# Patient Record
Sex: MALE | Race: BLACK OR AFRICAN AMERICAN | NOT HISPANIC OR LATINO | Employment: FULL TIME | URBAN - METROPOLITAN AREA
[De-identification: names, ages, dates, MRNs, and addresses within clinical notes are randomized per-mention and may not be internally consistent; named-entity substitution may affect disease eponyms.]

---

## 2017-02-21 ENCOUNTER — LAB VISIT (OUTPATIENT)
Dept: LAB | Facility: HOSPITAL | Age: 47
End: 2017-02-21
Attending: INTERNAL MEDICINE
Payer: COMMERCIAL

## 2017-02-21 ENCOUNTER — OFFICE VISIT (OUTPATIENT)
Dept: INTERNAL MEDICINE | Facility: CLINIC | Age: 47
End: 2017-02-21
Payer: COMMERCIAL

## 2017-02-21 VITALS
HEART RATE: 75 BPM | OXYGEN SATURATION: 98 % | DIASTOLIC BLOOD PRESSURE: 100 MMHG | TEMPERATURE: 97 F | SYSTOLIC BLOOD PRESSURE: 134 MMHG | HEIGHT: 73 IN | WEIGHT: 298.06 LBS | BODY MASS INDEX: 39.5 KG/M2

## 2017-02-21 DIAGNOSIS — E66.01 SEVERE OBESITY (BMI 35.0-39.9) WITH COMORBIDITY: ICD-10-CM

## 2017-02-21 DIAGNOSIS — I10 HYPERTENSION GOAL BP (BLOOD PRESSURE) < 140/90: Primary | ICD-10-CM

## 2017-02-21 DIAGNOSIS — I10 HYPERTENSION GOAL BP (BLOOD PRESSURE) < 140/90: ICD-10-CM

## 2017-02-21 LAB
ANION GAP SERPL CALC-SCNC: 5 MMOL/L
BUN SERPL-MCNC: 13 MG/DL
CALCIUM SERPL-MCNC: 9.2 MG/DL
CHLORIDE SERPL-SCNC: 107 MMOL/L
CHOLEST/HDLC SERPL: 3.1 {RATIO}
CO2 SERPL-SCNC: 27 MMOL/L
CREAT SERPL-MCNC: 1 MG/DL
EST. GFR  (AFRICAN AMERICAN): >60 ML/MIN/1.73 M^2
EST. GFR  (NON AFRICAN AMERICAN): >60 ML/MIN/1.73 M^2
GLUCOSE SERPL-MCNC: 82 MG/DL
HDL/CHOLESTEROL RATIO: 32.3 %
HDLC SERPL-MCNC: 189 MG/DL
HDLC SERPL-MCNC: 61 MG/DL
LDLC SERPL CALC-MCNC: 116.6 MG/DL
NONHDLC SERPL-MCNC: 128 MG/DL
POTASSIUM SERPL-SCNC: 4.7 MMOL/L
SODIUM SERPL-SCNC: 139 MMOL/L
TRIGL SERPL-MCNC: 57 MG/DL

## 2017-02-21 PROCEDURE — 36415 COLL VENOUS BLD VENIPUNCTURE: CPT

## 2017-02-21 PROCEDURE — 3078F DIAST BP <80 MM HG: CPT | Mod: S$GLB,,, | Performed by: INTERNAL MEDICINE

## 2017-02-21 PROCEDURE — 80061 LIPID PANEL: CPT

## 2017-02-21 PROCEDURE — 80048 BASIC METABOLIC PNL TOTAL CA: CPT

## 2017-02-21 PROCEDURE — 99214 OFFICE O/P EST MOD 30 MIN: CPT | Mod: S$GLB,,, | Performed by: INTERNAL MEDICINE

## 2017-02-21 PROCEDURE — 1160F RVW MEDS BY RX/DR IN RCRD: CPT | Mod: S$GLB,,, | Performed by: INTERNAL MEDICINE

## 2017-02-21 PROCEDURE — 3075F SYST BP GE 130 - 139MM HG: CPT | Mod: S$GLB,,, | Performed by: INTERNAL MEDICINE

## 2017-02-21 PROCEDURE — 99999 PR PBB SHADOW E&M-EST. PATIENT-LVL III: CPT | Mod: PBBFAC,,, | Performed by: INTERNAL MEDICINE

## 2017-02-21 RX ORDER — AMLODIPINE BESYLATE 10 MG/1
10 TABLET ORAL DAILY
Qty: 30 TABLET | Refills: 6 | Status: SHIPPED | OUTPATIENT
Start: 2017-02-21 | End: 2017-10-18 | Stop reason: SDUPTHER

## 2017-02-21 RX ORDER — LOSARTAN POTASSIUM 100 MG/1
100 TABLET ORAL DAILY
Qty: 30 TABLET | Refills: 6 | Status: SHIPPED | OUTPATIENT
Start: 2017-02-21 | End: 2017-10-18 | Stop reason: SDUPTHER

## 2017-02-21 RX ORDER — ATENOLOL 50 MG/1
50 TABLET ORAL DAILY
Qty: 30 TABLET | Refills: 6 | Status: SHIPPED | OUTPATIENT
Start: 2017-02-21 | End: 2017-10-11 | Stop reason: SDUPTHER

## 2017-02-21 NOTE — MR AVS SNAPSHOT
[facility]  [address] [place] [id number]  Phone: [phone]  Fax: [phone]                  Odilon Del Rio   2017 8:40 AM   Office Visit    Description:  Male : 1970   Provider:  Lisa Osborn DO   Department:  [facility]           Reason for Visit     Follow-up           Diagnoses this Visit        Comments    Hypertension goal BP (blood pressure) < 140/90    -  Primary     Severe obesity (BMI 35.0-39.9) with comorbidity                To Do List           Goals (5 Years of Data)     None       These Medications        Disp Refills Start End    losartan (COZAAR) 100 MG tablet 30 tablet 6 2017     Take 1 tablet (100 mg total) by mouth once daily. - Oral    Pharmacy: [facility] [address] [place] [address] Ph #: [phone]       atenolol (TENORMIN) 50 MG tablet 30 tablet 6 2017     Take 1 tablet (50 mg total) by mouth once daily. - Oral    Pharmacy: [address] Ph #: [phone]       amlodipine (NORVASC) 10 MG tablet 30 tablet 6 2017     Take 1 tablet (10 mg total) by mouth once daily. - Oral    Pharmacy: [address] Ph #: [phone]         [facility]     [facility] Care Line -  Assistance  Registered nurses in the [facility] provide clinical advisement, health education, appointment booking, and other advisory services.  Call for this free service at [phone].             Medications           Message regarding Medications     Verify the changes and/or additions to your medication regime listed below are the same as discussed with your clinician today.  If any of these changes or additions are incorrect, please notify your healthcare provider.             Verify that the below list of medications is an accurate representation  "of the medications you are currently taking.  If none reported, the list may be blank. If incorrect, please contact your healthcare provider. Carry this list with you in case of emergency.           Current Medications     amlodipine (NORVASC) 10 MG tablet Take 1 tablet (10 mg total) by mouth once daily.    atenolol (TENORMIN) 50 MG tablet Take 1 tablet (50 mg total) by mouth once daily.    losartan (COZAAR) 100 MG tablet Take 1 tablet (100 mg total) by mouth once daily.           Clinical Reference Information           Your Vitals Were     BP Pulse Temp Height Weight SpO2    134/100 75 97.2 °F (36.2 °C) (Tympanic) 6' 1" (1.854 m) 135.2 kg (298 lb 1 oz) 98%    BMI                39.32 kg/m2          Blood Pressure          Most Recent Value    BP  (!)  134/100      Allergies as of 2/21/2017     No Known Drug Allergies      Immunizations Administered on Date of Encounter - 2/21/2017     None      [facility] Sign-Up     Activating your [facility] account is as easy as 1-2-3!     1) Visit [email or web address], select Sign Up Now, enter this activation code and your date of birth, then select Next.  [email or web address]  Expires: 4/7/2017  9:35 AM      2) Create a username and password to use when you visit [facility] in the future and select a security question in case you lose your password and select Next.    3) Enter your e-mail address and click Sign Up!    Additional Information  If you have questions, please e-mail [email or web address] or call [phone] to talk to our [facility] staff. Remember, [facility] is NOT to be used for urgent needs. For medical emergencies, dial 911.         Language Assistance Services     ATTENTION: Language assistance services are available, free of charge. Please call [phone].      ATENCIÓN: Si habla español, tiene a palacio disposición servicios gratuitos de asistencia lingüística. Llame al [phone].     CHÚ Ý: N?u b?n nói Ti?ng Vi?t, có các d?ch v? h? tr? ngôn ng? mi?n phí arlene dinh " b?n. G?i s? [phone].         [facility] complies with applicable Federal civil rights laws and does not discriminate on the basis of race, color, national origin, age, disability, or sex.

## 2017-02-21 NOTE — PROGRESS NOTES
Odilon Tejeda [place]  46 y.o. Black or  male    Chief Complaint   Patient presents with    Follow-up     HPI: Here today to follow up on chronic conditions.  HTN--he has been out of his b/p medication for several months. He has been checking his b/p on occasion and it has been high. He denies symptoms.   Lab Results   Component Value Date    LDLCALC 103.4 03/11/2015       Past Medical History   Diagnosis Date    Hypertension     Morbid obesity     CHARLINE (obstructive sleep apnea)     Testosterone deficiency        Current Outpatient Prescriptions:     amlodipine (NORVASC) 10 MG tablet, Take 1 tablet (10 mg total) by mouth once daily., Disp: 30 tablet, Rfl: 6    atenolol (TENORMIN) 50 MG tablet, Take 1 tablet (50 mg total) by mouth once daily., Disp: 30 tablet, Rfl: 6    losartan (COZAAR) 100 MG tablet, Take 1 tablet (100 mg total) by mouth once daily., Disp: 30 tablet, Rfl: 6    Allergies:  Review of patient's allergies indicates:   Allergen Reactions    No known drug allergies        ROS:  Denies headache, dizziness, chest pain or shortness of breath    PHYSICAL EXAM:  VITAL SIGNS: Reviewed  GENERAL: Alert and oriented, no acute distress  HEART: Normal S1 and S2, regular rate and rhythm  LUNGS: Bilaterally clear to auscultation, respirations unlabored    ASSESSMENT/PLAN:  Odilon was seen today for follow-up.    Diagnoses and all orders for this visit:    Hypertension goal BP (blood pressure) < 140/90  -     losartan (COZAAR) 100 MG tablet; Take 1 tablet (100 mg total) by mouth once daily.  -     atenolol (TENORMIN) 50 MG tablet; Take 1 tablet (50 mg total) by mouth once daily.  -     amlodipine (NORVASC) 10 MG tablet; Take 1 tablet (10 mg total) by mouth once daily.  -     Discussed diet, exercise and weight loss     Severe obesity (BMI 35.0-39.9) with comorbidity  -     Counseled regarding diet, exercise and weight loss     F/U in 3 months

## 2017-02-23 ENCOUNTER — NUTRITION (OUTPATIENT)
Dept: NUTRITION | Facility: CLINIC | Age: 47
End: 2017-02-23
Payer: COMMERCIAL

## 2017-02-23 VITALS
BODY MASS INDEX: 38.86 KG/M2 | HEIGHT: 73 IN | SYSTOLIC BLOOD PRESSURE: 140 MMHG | DIASTOLIC BLOOD PRESSURE: 100 MMHG | WEIGHT: 293.19 LBS

## 2017-02-23 DIAGNOSIS — Z71.3 DIETARY COUNSELING AND SURVEILLANCE: Primary | ICD-10-CM

## 2017-02-23 NOTE — PROGRESS NOTES
"IDEAL PROTEIN PROTOCOL  Weight Management Weekly Progress Record    PRIMARY PROVIDER:  Lisa Osborn DO   REFERRING PROVIDER: No ref. provider found  IDEAL PROTEIN RD/NP:  Pablo Elizabeth     MEASUREMENTS:  DATE---------Neck--------Arm-----Chest-----Waist----Hip-----Thigh---Weight---Total Lost  04/30/2014----18.3"--------17.5"-----57.7"-----52.5"-----51.9"----23.6"------337.8 lbs---0000 lbs   5/07/2014------17.9"-------16.9"------53.5"-----50.0"-----50.0"----23.6"------320.8 lbs---017.0 lbs   5/14/2014------17.5"-------16.4"------53.2"-----50.0"-----50.0"----23.6"------318.0 lbs---019.8 lbs   5/21/2014------17.0"-------16.0"------53.0"-----47.5"-----50.0"----23.0"------311.4 lbs---026.4 lbs   5/28/2014------17.0"-------16.0"------51.5"-----49.0"-----49.7"----23.0"------308.4 lbs---029.4 lbs   6/04/2014------16.8"-------16.5"------51.5"-----46.9"-----48.7"----23.2"------299.8 lbs---038.0 lbs   6/11/2014------16.7"-------16.6"------51.1"-----46.5"-----47.7"----23.0"------297.8 lbs---040.0 lbs   6/18/2014------16.7"-------16.2"------50.2"-----46.5"-----45.7"----22.5"------289.2 lbs---048.6 lbs   6/25/2014------16.5"-------16.0"------49.5"-----45.5"-----45.3"----22.4"------283.4 lbs---054.4 lbs   7/02/2014--------16.5"-------15.0"------49.5"-----45.5"-----45.3"----22.4"------281.4 lbs---056.4 lbs   7/09/2014--------16.2"-------15.0"------47.8"-----42.9"-----45.8"----22.8"------274.0 lbs---063.8 lbs   7/16/2014--------16.4"-------15.8"------48.0"-----45.0"-----45.8"----21.8"------273.6 lbs---064.2 lbs   7/25/2014--------16.2"-------15.6"------48.0"-----43.7"-----43.7"----21.0"------274.0 lbs---063.8 lbs   7/30/2014--------16.1"-------16.0"------47.5"-----44.0"-----42.7"----20.7"------269.8 lbs---068.0 lbs   8/08/2014--------16.1"-------16.2"------48.5"-----43.5"-----42.7"----22.0"------271.2 lbs---066.6lbs   8/15/2014--------16.1"-------15.8"------47.7"-----44.0"-----43.7"----21.5"------271.2 lbs---066.6lbs " "  8/20/2014--------16.5"-------15.7"------48.5"-----43.7"-----44.5"----22.0"------272.8 lbs---065.0lbs   8/03/2014--------16.8"-------16.2"------48.5"-----43.3"-----42.0"----22.0"------267.4 lbs---070.4lbs   12/3/2014--------16.5"-------16.7"------48.7"-----44.0"-----45.0"----21.0"------279.8 lbs---058.0lbs  1/21/2015--------17.6"-------17.2"------51.5"-----47.0"-----47.5"----22.0"------292.0 lbs---045.8lbs [facility] up.  1/28/2015--------17.3"-------16.9"------50.2"-----45.2"-----46.0"----22.0"------284.8 lbs---053.0lbs   2/04/2015--------16.4"-------16.7"------49.5"-----45.2"-----46.0"----22.0"------275.4 lbs---062.4lbs   2/11/2015--------16.4"-------16.5"------48.1"-----44.6"-----45.5"----21.5"------270.6 lbs---067.2lbs   2/25/2015--------16.4"-------16.4"------47.8"-----45.5"-----43.2"----21.5"------269.2 lbs---068.6lbs   3/11/2015--------16.9"-------16.0"------48.9"-----44.5"-----46.0"----23.0"------273.0 lbs---064.6lbs   3/18/2015--------17.4"-------16.0"------48.2"-----44.5"-----46.0"----22.4"------275.2 lbs---062.4lbs   7/16/2015--------17.4"-------16.2"------50.5"-----48.0"-----47.0"----20.5"------297.6 lbs---[id number] tune    7/24/2015--------17.4"-------16.2"------49.7''-----46.5"-----46.0"----20.5"------281.4 lbs---016.2lbs  7/31/2015--------17.0"-------16.1"------48.4''-----44.3"-----45.4"----20.4"------278.2 lbs---019.4lbs   8/14/2015--------17.0"-------16.3"------49.2''-----46.5"-----46.0"----23.0"------282.6 lbs---015.0lbs   8/28/2015--------17.0"-------16.3"------48.0''-----45.0"-----45.0"----20.6"------269.2 lbs---028.4lbs   9/04/2015--------16.5"-------16.3"------48.0''-----43.0"-----45.0"----20.0"------268.4 lbs---029.2lbs   9/11/2015--------16.5"-------16.3"------48.0''-----43.0"-----45.0"----20.0"------273.0 lbs---029.2lbs   9/18/2015--------16.5"-------16.0"------47.4''-----44.0"-----44.2"----20.0"------271.0 lbs---031.2lbs " "  10/16/2015--------16.7"------15.5"------47.0''-----43.5"-----44.5"----21.0"------271.0 lbs---031.2lbs   02/23/2017--------17.0"------16.7"------51.0''-----47.0"-----47.9"----23.8"------293.1 lbs---09.1lbs tune up         BODY COMPOSITION:   DATE----------BMI----Body Fat%--------Lean Mass----------Fat Mass----Hydration Index   04/30/2014-----44.6-------34.0%------------223.0 lbs-----------114.8 lbs---------  -8.37  5/07/2014-------42.3-------38.6%------------197.1 lbs-----------123.7 lbs---------  -6.33  5/14/2014-------42.0-------32.2%------------215.5 lbs-----------102.5 lbs---------  -7.42  5/21/2014-------41.1  5/28/2014-------40.7-------31.3%------------211.9 lbs-----------096.5lbs---------  -7.02  6/04/2014-------39.6-------30.4%------------208.6 lbs-----------091.2lbs---------  -6.45  6/11/2014-------39.3-------30.2%------------207.9 lbs-----------089.9lbs---------  -6.30  6/18/2014-------38.2-------29.3%------------204.6 lbs-----------084.6lbs---------  -5.89  6/25/2014-------37.4-------34.6%------------185.3 lbs-----------098.1lbs---------  -6.17  7/02/2014-------37.1-------28.3%------------201.7 lbs-----------079.7lbs---------  -5.54  7/09/2014-------36.2-------30.0%------------191.9 lbs-----------082.1lbs---------  -5.59  7/16/2014-------36.2-------27.4%------------198.7 lbs-----------074.9lbs---------  -3.36  7/25/2014-------36.2-------27.4%------------198.9lbs------------075.1lbs---------  -2.86  7/30/2014-------35.6-------27.9%------------194.6lbs------------075.2lbs---------  -2.93  8/08/2014-------35.8-------27.1%------------197.8lbs------------073.4lbs---------  -2.53  8/15/2014-------35.6-------27.8%------------194.5lbs------------075.1lbs---------  -3.85  12/03//2014-------36.9-------25.0%------------209.8lbs----------070.0lbs---------  -4.41      GENERAL BODY FAT %:   CLASSIFICATION------WOMEN------MEN  Essential " Fat----------------10-12%--------2-4%  Athletes--------------------14-20%--------6-13%  Fitness----------------------21-24%-------14-17%  Acceptable-----------------25-31%-------18-25%  At Risk---------------------->32%---------->25%    MEAL REPLACEMENT & SUPPLEMENT NOTES:  PATIENT USING MEAL AND SUPPLEMENTS AS DIRECTED.      GOAL WT:  265 lbs.  IPP PLAN: Regular, Phase I = 13 wks, Phase II = 1 wks and Phase III = 2 wks   IPP Daily Supplements: 2 Natura Multi-Vitamins, 1 Natura Potassium Citrate, 3 Natura Omega 3, 4 Natura Jeffery-Mag, 1-2 tsp olive or grape seed extract oil, ½ tsp Ideal Salt.    WEEKLY COMMENTS & EDUCATION:Thank you for the opportunity to provide your patient with long-term wt loss management.

## 2017-02-24 ENCOUNTER — TELEPHONE (OUTPATIENT)
Dept: INTERNAL MEDICINE | Facility: CLINIC | Age: 47
End: 2017-02-24

## 2017-02-24 NOTE — TELEPHONE ENCOUNTER
----- Message from Lisa Osborn DO sent at 2/22/2017  5:22 PM CST -----  Notify patient labs look good and there are not significant abnormal findings.   F/U in 3 months for HTN.

## 2017-03-17 ENCOUNTER — NUTRITION (OUTPATIENT)
Dept: NUTRITION | Facility: CLINIC | Age: 47
End: 2017-03-17
Payer: COMMERCIAL

## 2017-03-17 DIAGNOSIS — Z71.3 DIETARY COUNSELING AND SURVEILLANCE: Primary | ICD-10-CM

## 2017-05-31 ENCOUNTER — NUTRITION (OUTPATIENT)
Dept: NUTRITION | Facility: CLINIC | Age: 47
End: 2017-05-31
Payer: COMMERCIAL

## 2017-05-31 VITALS
HEIGHT: 73 IN | WEIGHT: 309.94 LBS | BODY MASS INDEX: 41.08 KG/M2 | DIASTOLIC BLOOD PRESSURE: 88 MMHG | SYSTOLIC BLOOD PRESSURE: 130 MMHG

## 2017-05-31 DIAGNOSIS — Z71.3 DIETARY COUNSELING AND SURVEILLANCE: Primary | ICD-10-CM

## 2017-05-31 NOTE — PROGRESS NOTES
"IDEAL PROTEIN PROTOCOL  Weight Management Weekly Progress Record    PRIMARY PROVIDER:  Lisa Osborn DO   REFERRING PROVIDER: No ref. provider found  IDEAL PROTEIN RD/NP:  Pablo Elizabeth     MEASUREMENTS:  DATE---------Neck--------Arm-----Chest-----Waist----Hip-----Thigh---Weight---Total Lost  04/30/2014----18.3"--------17.5"-----57.7"-----52.5"-----51.9"----23.6"------337.8 lbs---0000 lbs   5/07/2014------17.9"-------16.9"------53.5"-----50.0"-----50.0"----23.6"------320.8 lbs---017.0 lbs   5/14/2014------17.5"-------16.4"------53.2"-----50.0"-----50.0"----23.6"------318.0 lbs---019.8 lbs   5/21/2014------17.0"-------16.0"------53.0"-----47.5"-----50.0"----23.0"------311.4 lbs---026.4 lbs   5/28/2014------17.0"-------16.0"------51.5"-----49.0"-----49.7"----23.0"------308.4 lbs---029.4 lbs   6/04/2014------16.8"-------16.5"------51.5"-----46.9"-----48.7"----23.2"------299.8 lbs---038.0 lbs   6/11/2014------16.7"-------16.6"------51.1"-----46.5"-----47.7"----23.0"------297.8 lbs---040.0 lbs   6/18/2014------16.7"-------16.2"------50.2"-----46.5"-----45.7"----22.5"------289.2 lbs---048.6 lbs   6/25/2014------16.5"-------16.0"------49.5"-----45.5"-----45.3"----22.4"------283.4 lbs---054.4 lbs   7/02/2014--------16.5"-------15.0"------49.5"-----45.5"-----45.3"----22.4"------281.4 lbs---056.4 lbs   7/09/2014--------16.2"-------15.0"------47.8"-----42.9"-----45.8"----22.8"------274.0 lbs---063.8 lbs   7/16/2014--------16.4"-------15.8"------48.0"-----45.0"-----45.8"----21.8"------273.6 lbs---064.2 lbs   7/25/2014--------16.2"-------15.6"------48.0"-----43.7"-----43.7"----21.0"------274.0 lbs---063.8 lbs   7/30/2014--------16.1"-------16.0"------47.5"-----44.0"-----42.7"----20.7"------269.8 lbs---068.0 lbs   8/08/2014--------16.1"-------16.2"------48.5"-----43.5"-----42.7"----22.0"------271.2 lbs---066.6lbs   8/15/2014--------16.1"-------15.8"------47.7"-----44.0"-----43.7"----21.5"------271.2 lbs---066.6lbs " "  8/20/2014--------16.5"-------15.7"------48.5"-----43.7"-----44.5"----22.0"------272.8 lbs---065.0lbs   8/03/2014--------16.8"-------16.2"------48.5"-----43.3"-----42.0"----22.0"------267.4 lbs---070.4lbs   12/3/2014--------16.5"-------16.7"------48.7"-----44.0"-----45.0"----21.0"------279.8 lbs---058.0lbs  1/21/2015--------17.6"-------17.2"------51.5"-----47.0"-----47.5"----22.0"------292.0 lbs---045.8lbs [facility] up.  1/28/2015--------17.3"-------16.9"------50.2"-----45.2"-----46.0"----22.0"------284.8 lbs---053.0lbs   2/04/2015--------16.4"-------16.7"------49.5"-----45.2"-----46.0"----22.0"------275.4 lbs---062.4lbs   2/11/2015--------16.4"-------16.5"------48.1"-----44.6"-----45.5"----21.5"------270.6 lbs---067.2lbs   2/25/2015--------16.4"-------16.4"------47.8"-----45.5"-----43.2"----21.5"------269.2 lbs---068.6lbs   3/11/2015--------16.9"-------16.0"------48.9"-----44.5"-----46.0"----23.0"------273.0 lbs---064.6lbs   3/18/2015--------17.4"-------16.0"------48.2"-----44.5"-----46.0"----22.4"------275.2 lbs---062.4lbs   7/16/2015--------17.4"-------16.2"------50.5"-----48.0"-----47.0"----20.5"------297.6 lbs---[id number] tune    7/24/2015--------17.4"-------16.2"------49.7''-----46.5"-----46.0"----20.5"------281.4 lbs---016.2lbs  7/31/2015--------17.0"-------16.1"------48.4''-----44.3"-----45.4"----20.4"------278.2 lbs---019.4lbs   8/14/2015--------17.0"-------16.3"------49.2''-----46.5"-----46.0"----23.0"------282.6 lbs---015.0lbs   8/28/2015--------17.0"-------16.3"------48.0''-----45.0"-----45.0"----20.6"------269.2 lbs---028.4lbs   9/04/2015--------16.5"-------16.3"------48.0''-----43.0"-----45.0"----20.0"------268.4 lbs---029.2lbs   9/11/2015--------16.5"-------16.3"------48.0''-----43.0"-----45.0"----20.0"------273.0 lbs---029.2lbs   9/18/2015--------16.5"-------16.0"------47.4''-----44.0"-----44.2"----20.0"------271.0 lbs---031.2lbs " "  10/16/2015--------16.7"------15.5"------47.0''-----43.5"-----44.5"----21.0"------271.0 lbs---031.2lbs   02/23/2017--------17.0"------16.7"------51.0''-----47.0"-----47.9"----23.8"------293.1 lbs---09.1lbs tune up     05/31/2017--------17.3"------16.4"------52.5''-----49.6"-----47.9"----23.2"------310.0 lbs---16.1lbs tune up       BODY COMPOSITION:   DATE----------BMI----Body Fat%--------Lean Mass----------Fat Mass----Hydration Index   04/30/2014-----44.6-------34.0%------------223.0 lbs-----------114.8 lbs---------  -8.37  5/07/2014-------42.3-------38.6%------------197.1 lbs-----------123.7 lbs---------  -6.33  5/14/2014-------42.0-------32.2%------------215.5 lbs-----------102.5 lbs---------  -7.42  5/21/2014-------41.1  5/28/2014-------40.7-------31.3%------------211.9 lbs-----------096.5lbs---------  -7.02  6/04/2014-------39.6-------30.4%------------208.6 lbs-----------091.2lbs---------  -6.45  6/11/2014-------39.3-------30.2%------------207.9 lbs-----------089.9lbs---------  -6.30  6/18/2014-------38.2-------29.3%------------204.6 lbs-----------084.6lbs---------  -5.89  6/25/2014-------37.4-------34.6%------------185.3 lbs-----------098.1lbs---------  -6.17  7/02/2014-------37.1-------28.3%------------201.7 lbs-----------079.7lbs---------  -5.54  7/09/2014-------36.2-------30.0%------------191.9 lbs-----------082.1lbs---------  -5.59  7/16/2014-------36.2-------27.4%------------198.7 lbs-----------074.9lbs---------  -3.36  7/25/2014-------36.2-------27.4%------------198.9lbs------------075.1lbs---------  -2.86  7/30/2014-------35.6-------27.9%------------194.6lbs------------075.2lbs---------  -2.93  8/08/2014-------35.8-------27.1%------------197.8lbs------------073.4lbs---------  -2.53  8/15/2014-------35.6-------27.8%------------194.5lbs------------075.1lbs---------  -3.85  12/03//2014-------36.9-------25.0%------------209.8lbs----------070.0lbs---------  -4.41      GENERAL BODY FAT %: "   CLASSIFICATION------WOMEN------MEN  Essential Fat----------------10-12%--------2-4%  Athletes--------------------14-20%--------6-13%  Fitness----------------------21-24%-------14-17%  Acceptable-----------------25-31%-------18-25%  At Risk---------------------->32%---------->25%    MEAL REPLACEMENT & SUPPLEMENT NOTES:  PATIENT USING MEAL AND SUPPLEMENTS AS DIRECTED.      GOAL WT:  265 lbs.  IPP PLAN: Regular, Phase I = 13 wks, Phase II = 1 wks and Phase III = 2 wks   IPP Daily Supplements: 2 Natura Multi-Vitamins, 1 Natura Potassium Citrate, 3 Natura Omega 3, 4 Natura Jeffery-Mag, 1-2 tsp olive or grape seed extract oil, ½ tsp Ideal Salt.    WEEKLY COMMENTS & EDUCATION:Thank you for the opportunity to provide your patient with long-term wt loss management.

## 2017-06-06 ENCOUNTER — NUTRITION (OUTPATIENT)
Dept: NUTRITION | Facility: CLINIC | Age: 47
End: 2017-06-06
Payer: COMMERCIAL

## 2017-06-06 VITALS
WEIGHT: 303.38 LBS | SYSTOLIC BLOOD PRESSURE: 130 MMHG | BODY MASS INDEX: 40.21 KG/M2 | HEIGHT: 73 IN | DIASTOLIC BLOOD PRESSURE: 80 MMHG

## 2017-06-06 DIAGNOSIS — Z71.3 DIETARY COUNSELING AND SURVEILLANCE: Primary | ICD-10-CM

## 2017-06-06 NOTE — PROGRESS NOTES
"IDEAL PROTEIN PROTOCOL  Weight Management Weekly Progress Record    PRIMARY PROVIDER:  Lisa Osborn DO   REFERRING PROVIDER: No ref. provider found  IDEAL PROTEIN RD/NP:  Pablo Elizabeth     MEASUREMENTS:  DATE---------Neck--------Arm-----Chest-----Waist----Hip-----Thigh---Weight---Total Lost  04/30/2014----18.3"--------17.5"-----57.7"-----52.5"-----51.9"----23.6"------337.8 lbs---0000 lbs   5/07/2014------17.9"-------16.9"------53.5"-----50.0"-----50.0"----23.6"------320.8 lbs---017.0 lbs   5/14/2014------17.5"-------16.4"------53.2"-----50.0"-----50.0"----23.6"------318.0 lbs---019.8 lbs   5/21/2014------17.0"-------16.0"------53.0"-----47.5"-----50.0"----23.0"------311.4 lbs---026.4 lbs   5/28/2014------17.0"-------16.0"------51.5"-----49.0"-----49.7"----23.0"------308.4 lbs---029.4 lbs   6/04/2014------16.8"-------16.5"------51.5"-----46.9"-----48.7"----23.2"------299.8 lbs---038.0 lbs   6/11/2014------16.7"-------16.6"------51.1"-----46.5"-----47.7"----23.0"------297.8 lbs---040.0 lbs   6/18/2014------16.7"-------16.2"------50.2"-----46.5"-----45.7"----22.5"------289.2 lbs---048.6 lbs   6/25/2014------16.5"-------16.0"------49.5"-----45.5"-----45.3"----22.4"------283.4 lbs---054.4 lbs   7/02/2014--------16.5"-------15.0"------49.5"-----45.5"-----45.3"----22.4"------281.4 lbs---056.4 lbs   7/09/2014--------16.2"-------15.0"------47.8"-----42.9"-----45.8"----22.8"------274.0 lbs---063.8 lbs   7/16/2014--------16.4"-------15.8"------48.0"-----45.0"-----45.8"----21.8"------273.6 lbs---064.2 lbs   7/25/2014--------16.2"-------15.6"------48.0"-----43.7"-----43.7"----21.0"------274.0 lbs---063.8 lbs   7/30/2014--------16.1"-------16.0"------47.5"-----44.0"-----42.7"----20.7"------269.8 lbs---068.0 lbs   8/08/2014--------16.1"-------16.2"------48.5"-----43.5"-----42.7"----22.0"------271.2 lbs---066.6lbs   8/15/2014--------16.1"-------15.8"------47.7"-----44.0"-----43.7"----21.5"------271.2 lbs---066.6lbs " "  8/20/2014--------16.5"-------15.7"------48.5"-----43.7"-----44.5"----22.0"------272.8 lbs---065.0lbs   8/03/2014--------16.8"-------16.2"------48.5"-----43.3"-----42.0"----22.0"------267.4 lbs---070.4lbs   12/3/2014--------16.5"-------16.7"------48.7"-----44.0"-----45.0"----21.0"------279.8 lbs---058.0lbs  1/21/2015--------17.6"-------17.2"------51.5"-----47.0"-----47.5"----22.0"------292.0 lbs---045.8lbs [facility] up.  1/28/2015--------17.3"-------16.9"------50.2"-----45.2"-----46.0"----22.0"------284.8 lbs---053.0lbs   2/04/2015--------16.4"-------16.7"------49.5"-----45.2"-----46.0"----22.0"------275.4 lbs---062.4lbs   2/11/2015--------16.4"-------16.5"------48.1"-----44.6"-----45.5"----21.5"------270.6 lbs---067.2lbs   2/25/2015--------16.4"-------16.4"------47.8"-----45.5"-----43.2"----21.5"------269.2 lbs---068.6lbs   3/11/2015--------16.9"-------16.0"------48.9"-----44.5"-----46.0"----23.0"------273.0 lbs---064.6lbs   3/18/2015--------17.4"-------16.0"------48.2"-----44.5"-----46.0"----22.4"------275.2 lbs---062.4lbs   7/16/2015--------17.4"-------16.2"------50.5"-----48.0"-----47.0"----20.5"------297.6 lbs---[id number] tune    7/24/2015--------17.4"-------16.2"------49.7''-----46.5"-----46.0"----20.5"------281.4 lbs---016.2lbs  7/31/2015--------17.0"-------16.1"------48.4''-----44.3"-----45.4"----20.4"------278.2 lbs---019.4lbs   8/14/2015--------17.0"-------16.3"------49.2''-----46.5"-----46.0"----23.0"------282.6 lbs---015.0lbs   8/28/2015--------17.0"-------16.3"------48.0''-----45.0"-----45.0"----20.6"------269.2 lbs---028.4lbs   9/04/2015--------16.5"-------16.3"------48.0''-----43.0"-----45.0"----20.0"------268.4 lbs---029.2lbs   9/11/2015--------16.5"-------16.3"------48.0''-----43.0"-----45.0"----20.0"------273.0 lbs---029.2lbs   9/18/2015--------16.5"-------16.0"------47.4''-----44.0"-----44.2"----20.0"------271.0 lbs---031.2lbs " "  10/16/2015--------16.7"------15.5"------47.0''-----43.5"-----44.5"----21.0"------271.0 lbs---031.2lbs   02/23/2017--------17.0"------16.7"------51.0''-----47.0"-----47.9"----23.8"------293.1 lbs---09.1lbs tune up     05/31/2017--------17.3"------16.4"------52.5''-----49.6"-----47.9"----23.2"------310.0 lbs---16.1lbs tune up   06/05/2017--------17.2"------17.5"------52.0''-----49.0"-----46.3"----21.2"------303.6 lbs---22.5 lbs tune up     BODY COMPOSITION:   DATE----------BMI----Body Fat%--------Lean Mass----------Fat Mass----Hydration Index   04/30/2014-----44.6-------34.0%------------223.0 lbs-----------114.8 lbs---------  -8.37  5/07/2014-------42.3-------38.6%------------197.1 lbs-----------123.7 lbs---------  -6.33  5/14/2014-------42.0-------32.2%------------215.5 lbs-----------102.5 lbs---------  -7.42  5/21/2014-------41.1  5/28/2014-------40.7-------31.3%------------211.9 lbs-----------096.5lbs---------  -7.02  6/04/2014-------39.6-------30.4%------------208.6 lbs-----------091.2lbs---------  -6.45  6/11/2014-------39.3-------30.2%------------207.9 lbs-----------089.9lbs---------  -6.30  6/18/2014-------38.2-------29.3%------------204.6 lbs-----------084.6lbs---------  -5.89  6/25/2014-------37.4-------34.6%------------185.3 lbs-----------098.1lbs---------  -6.17  7/02/2014-------37.1-------28.3%------------201.7 lbs-----------079.7lbs---------  -5.54  7/09/2014-------36.2-------30.0%------------191.9 lbs-----------082.1lbs---------  -5.59  7/16/2014-------36.2-------27.4%------------198.7 lbs-----------074.9lbs---------  -3.36  7/25/2014-------36.2-------27.4%------------198.9lbs------------075.1lbs---------  -2.86  7/30/2014-------35.6-------27.9%------------194.6lbs------------075.2lbs---------  -2.93  8/08/2014-------35.8-------27.1%------------197.8lbs------------073.4lbs---------  -2.53  8/15/2014-------35.6-------27.8%------------194.5lbs------------075.1lbs---------  " -3.85  12/03//2014-------36.9-------25.0%------------209.8lbs----------070.0lbs---------  -4.41      GENERAL BODY FAT %:   CLASSIFICATION------WOMEN------MEN  Essential Fat----------------10-12%--------2-4%  Athletes--------------------14-20%--------6-13%  Fitness----------------------21-24%-------14-17%  Acceptable-----------------25-31%-------18-25%  At Risk---------------------->32%---------->25%    MEAL REPLACEMENT & SUPPLEMENT NOTES:  PATIENT USING MEAL AND SUPPLEMENTS AS DIRECTED.      GOAL WT:  265 lbs.  IPP PLAN: Regular, Phase I = 13 wks, Phase II = 1 wks and Phase III = 2 wks   IPP Daily Supplements: 2 Natura Multi-Vitamins, 1 Natura Potassium Citrate, 3 Natura Omega 3, 4 Natura Jeffery-Mag, 1-2 tsp olive or grape seed extract oil, ½ tsp Ideal Salt.    WEEKLY COMMENTS & EDUCATION:Thank you for the opportunity to provide your patient with long-term wt loss management.

## 2017-06-13 ENCOUNTER — NUTRITION (OUTPATIENT)
Dept: NUTRITION | Facility: CLINIC | Age: 47
End: 2017-06-13
Payer: COMMERCIAL

## 2017-06-13 VITALS
DIASTOLIC BLOOD PRESSURE: 80 MMHG | HEIGHT: 73 IN | WEIGHT: 297.63 LBS | SYSTOLIC BLOOD PRESSURE: 122 MMHG | BODY MASS INDEX: 39.44 KG/M2

## 2017-06-13 DIAGNOSIS — Z71.3 DIETARY COUNSELING AND SURVEILLANCE: Primary | ICD-10-CM

## 2017-06-13 NOTE — PROGRESS NOTES
"IDEAL PROTEIN PROTOCOL  Weight Management Weekly Progress Record    PRIMARY PROVIDER:  Lisa Osborn DO   REFERRING PROVIDER: No ref. provider found  IDEAL PROTEIN RD/NP:  Pablo Elizabeth     MEASUREMENTS:  DATE---------Neck--------Arm-----Chest-----Waist----Hip-----Thigh---Weight---Total Lost  04/30/2014----18.3"--------17.5"-----57.7"-----52.5"-----51.9"----23.6"------337.8 lbs---0000 lbs   5/07/2014------17.9"-------16.9"------53.5"-----50.0"-----50.0"----23.6"------320.8 lbs---017.0 lbs   5/14/2014------17.5"-------16.4"------53.2"-----50.0"-----50.0"----23.6"------318.0 lbs---019.8 lbs   5/21/2014------17.0"-------16.0"------53.0"-----47.5"-----50.0"----23.0"------311.4 lbs---026.4 lbs   5/28/2014------17.0"-------16.0"------51.5"-----49.0"-----49.7"----23.0"------308.4 lbs---029.4 lbs   6/04/2014------16.8"-------16.5"------51.5"-----46.9"-----48.7"----23.2"------299.8 lbs---038.0 lbs   6/11/2014------16.7"-------16.6"------51.1"-----46.5"-----47.7"----23.0"------297.8 lbs---040.0 lbs   6/18/2014------16.7"-------16.2"------50.2"-----46.5"-----45.7"----22.5"------289.2 lbs---048.6 lbs   6/25/2014------16.5"-------16.0"------49.5"-----45.5"-----45.3"----22.4"------283.4 lbs---054.4 lbs   7/02/2014--------16.5"-------15.0"------49.5"-----45.5"-----45.3"----22.4"------281.4 lbs---056.4 lbs   7/09/2014--------16.2"-------15.0"------47.8"-----42.9"-----45.8"----22.8"------274.0 lbs---063.8 lbs   7/16/2014--------16.4"-------15.8"------48.0"-----45.0"-----45.8"----21.8"------273.6 lbs---064.2 lbs   7/25/2014--------16.2"-------15.6"------48.0"-----43.7"-----43.7"----21.0"------274.0 lbs---063.8 lbs   7/30/2014--------16.1"-------16.0"------47.5"-----44.0"-----42.7"----20.7"------269.8 lbs---068.0 lbs   8/08/2014--------16.1"-------16.2"------48.5"-----43.5"-----42.7"----22.0"------271.2 lbs---066.6lbs   8/15/2014--------16.1"-------15.8"------47.7"-----44.0"-----43.7"----21.5"------271.2 lbs---066.6lbs " "  8/20/2014--------16.5"-------15.7"------48.5"-----43.7"-----44.5"----22.0"------272.8 lbs---065.0lbs   8/03/2014--------16.8"-------16.2"------48.5"-----43.3"-----42.0"----22.0"------267.4 lbs---070.4lbs   12/3/2014--------16.5"-------16.7"------48.7"-----44.0"-----45.0"----21.0"------279.8 lbs---058.0lbs  1/21/2015--------17.6"-------17.2"------51.5"-----47.0"-----47.5"----22.0"------292.0 lbs---045.8lbs [facility] up.  1/28/2015--------17.3"-------16.9"------50.2"-----45.2"-----46.0"----22.0"------284.8 lbs---053.0lbs   2/04/2015--------16.4"-------16.7"------49.5"-----45.2"-----46.0"----22.0"------275.4 lbs---062.4lbs   2/11/2015--------16.4"-------16.5"------48.1"-----44.6"-----45.5"----21.5"------270.6 lbs---067.2lbs   2/25/2015--------16.4"-------16.4"------47.8"-----45.5"-----43.2"----21.5"------269.2 lbs---068.6lbs   3/11/2015--------16.9"-------16.0"------48.9"-----44.5"-----46.0"----23.0"------273.0 lbs---064.6lbs   3/18/2015--------17.4"-------16.0"------48.2"-----44.5"-----46.0"----22.4"------275.2 lbs---062.4lbs   7/16/2015--------17.4"-------16.2"------50.5"-----48.0"-----47.0"----20.5"------297.6 lbs---[id number] tune    7/24/2015--------17.4"-------16.2"------49.7''-----46.5"-----46.0"----20.5"------281.4 lbs---016.2lbs  7/31/2015--------17.0"-------16.1"------48.4''-----44.3"-----45.4"----20.4"------278.2 lbs---019.4lbs   8/14/2015--------17.0"-------16.3"------49.2''-----46.5"-----46.0"----23.0"------282.6 lbs---015.0lbs   8/28/2015--------17.0"-------16.3"------48.0''-----45.0"-----45.0"----20.6"------269.2 lbs---028.4lbs   9/04/2015--------16.5"-------16.3"------48.0''-----43.0"-----45.0"----20.0"------268.4 lbs---029.2lbs   9/11/2015--------16.5"-------16.3"------48.0''-----43.0"-----45.0"----20.0"------273.0 lbs---029.2lbs   9/18/2015--------16.5"-------16.0"------47.4''-----44.0"-----44.2"----20.0"------271.0 lbs---031.2lbs " "  10/16/2015--------16.7"------15.5"------47.0''-----43.5"-----44.5"----21.0"------271.0 lbs---031.2lbs   02/23/2017--------17.0"------16.7"------51.0''-----47.0"-----47.9"----23.8"------293.1 lbs---09.1lbs tune up     05/31/2017--------17.3"------16.4"------52.5''-----49.6"-----47.9"----23.2"------310.0 lbs---16.1lbs tune up   06/05/2017--------17.2"------17.5"------52.0''-----49.0"-----46.3"----21.2"------303.6 lbs---22.5 lbs tune up   06/13/2017--------17.1"------17.0"------51.2''-----48.5"-----46.3"----21.8"------297.5 lbs---28.6 lbs tune up     BODY COMPOSITION:   DATE----------BMI----Body Fat%--------Lean Mass----------Fat Mass----Hydration Index   04/30/2014-----44.6-------34.0%------------223.0 lbs-----------114.8 lbs---------  -8.37  5/07/2014-------42.3-------38.6%------------197.1 lbs-----------123.7 lbs---------  -6.33  5/14/2014-------42.0-------32.2%------------215.5 lbs-----------102.5 lbs---------  -7.42  5/21/2014-------41.1  5/28/2014-------40.7-------31.3%------------211.9 lbs-----------096.5lbs---------  -7.02  6/04/2014-------39.6-------30.4%------------208.6 lbs-----------091.2lbs---------  -6.45  6/11/2014-------39.3-------30.2%------------207.9 lbs-----------089.9lbs---------  -6.30  6/18/2014-------38.2-------29.3%------------204.6 lbs-----------084.6lbs---------  -5.89  6/25/2014-------37.4-------34.6%------------185.3 lbs-----------098.1lbs---------  -6.17  7/02/2014-------37.1-------28.3%------------201.7 lbs-----------079.7lbs---------  -5.54  7/09/2014-------36.2-------30.0%------------191.9 lbs-----------082.1lbs---------  -5.59  7/16/2014-------36.2-------27.4%------------198.7 lbs-----------074.9lbs---------  -3.36  7/25/2014-------36.2-------27.4%------------198.9lbs------------075.1lbs---------  -2.86  7/30/2014-------35.6-------27.9%------------194.6lbs------------075.2lbs---------  -2.93  8/08/2014-------35.8-------27.1%------------197.8lbs------------073.4lbs---------  " -2.53  8/15/2014-------35.6-------27.8%------------194.5lbs------------075.1lbs---------  -3.85  12/03//2014-------36.9-------25.0%------------209.8lbs----------070.0lbs---------  -4.41      GENERAL BODY FAT %:   CLASSIFICATION------WOMEN------MEN  Essential Fat----------------10-12%--------2-4%  Athletes--------------------14-20%--------6-13%  Fitness----------------------21-24%-------14-17%  Acceptable-----------------25-31%-------18-25%  At Risk---------------------->32%---------->25%    MEAL REPLACEMENT & SUPPLEMENT NOTES:  PATIENT USING MEAL AND SUPPLEMENTS AS DIRECTED.      GOAL WT:  265 lbs.  IPP PLAN: Regular, Phase I = 13 wks, Phase II = 1 wks and Phase III = 2 wks   IPP Daily Supplements: 2 Natura Multi-Vitamins, 1 Natura Potassium Citrate, 3 Natura Omega 3, 4 Natura Jeffery-Mag, 1-2 tsp olive or grape seed extract oil, ½ tsp Ideal Salt.    WEEKLY COMMENTS & EDUCATION:Thank you for the opportunity to provide your patient with long-term wt loss management.

## 2017-06-27 ENCOUNTER — NUTRITION (OUTPATIENT)
Dept: NUTRITION | Facility: CLINIC | Age: 47
End: 2017-06-27

## 2017-06-27 VITALS
WEIGHT: 305.13 LBS | BODY MASS INDEX: 40.44 KG/M2 | DIASTOLIC BLOOD PRESSURE: 70 MMHG | SYSTOLIC BLOOD PRESSURE: 120 MMHG | HEIGHT: 73 IN

## 2017-06-27 DIAGNOSIS — Z71.3 DIETARY COUNSELING AND SURVEILLANCE: Primary | ICD-10-CM

## 2017-06-27 NOTE — PROGRESS NOTES
"IDEAL PROTEIN PROTOCOL  Weight Management Weekly Progress Record    PRIMARY PROVIDER:  Lisa Osborn DO   REFERRING PROVIDER: No ref. provider found  IDEAL PROTEIN RD/NP:  Pablo Elizabeth     MEASUREMENTS:  DATE---------Neck--------Arm-----Chest-----Waist----Hip-----Thigh---Weight---Total Lost  04/30/2014----18.3"--------17.5"-----57.7"-----52.5"-----51.9"----23.6"------337.8 lbs---0000 lbs   5/07/2014------17.9"-------16.9"------53.5"-----50.0"-----50.0"----23.6"------320.8 lbs---017.0 lbs   5/14/2014------17.5"-------16.4"------53.2"-----50.0"-----50.0"----23.6"------318.0 lbs---019.8 lbs   5/21/2014------17.0"-------16.0"------53.0"-----47.5"-----50.0"----23.0"------311.4 lbs---026.4 lbs   5/28/2014------17.0"-------16.0"------51.5"-----49.0"-----49.7"----23.0"------308.4 lbs---029.4 lbs   6/04/2014------16.8"-------16.5"------51.5"-----46.9"-----48.7"----23.2"------299.8 lbs---038.0 lbs   6/11/2014------16.7"-------16.6"------51.1"-----46.5"-----47.7"----23.0"------297.8 lbs---040.0 lbs   6/18/2014------16.7"-------16.2"------50.2"-----46.5"-----45.7"----22.5"------289.2 lbs---048.6 lbs   6/25/2014------16.5"-------16.0"------49.5"-----45.5"-----45.3"----22.4"------283.4 lbs---054.4 lbs   7/02/2014--------16.5"-------15.0"------49.5"-----45.5"-----45.3"----22.4"------281.4 lbs---056.4 lbs   7/09/2014--------16.2"-------15.0"------47.8"-----42.9"-----45.8"----22.8"------274.0 lbs---063.8 lbs   7/16/2014--------16.4"-------15.8"------48.0"-----45.0"-----45.8"----21.8"------273.6 lbs---064.2 lbs   7/25/2014--------16.2"-------15.6"------48.0"-----43.7"-----43.7"----21.0"------274.0 lbs---063.8 lbs   7/30/2014--------16.1"-------16.0"------47.5"-----44.0"-----42.7"----20.7"------269.8 lbs---068.0 lbs   8/08/2014--------16.1"-------16.2"------48.5"-----43.5"-----42.7"----22.0"------271.2 lbs---066.6lbs   8/15/2014--------16.1"-------15.8"------47.7"-----44.0"-----43.7"----21.5"------271.2 lbs---066.6lbs " "  8/20/2014--------16.5"-------15.7"------48.5"-----43.7"-----44.5"----22.0"------272.8 lbs---065.0lbs   8/03/2014--------16.8"-------16.2"------48.5"-----43.3"-----42.0"----22.0"------267.4 lbs---070.4lbs   12/3/2014--------16.5"-------16.7"------48.7"-----44.0"-----45.0"----21.0"------279.8 lbs---058.0lbs  1/21/2015--------17.6"-------17.2"------51.5"-----47.0"-----47.5"----22.0"------292.0 lbs---045.8lbs [facility] up.  1/28/2015--------17.3"-------16.9"------50.2"-----45.2"-----46.0"----22.0"------284.8 lbs---053.0lbs   2/04/2015--------16.4"-------16.7"------49.5"-----45.2"-----46.0"----22.0"------275.4 lbs---062.4lbs   2/11/2015--------16.4"-------16.5"------48.1"-----44.6"-----45.5"----21.5"------270.6 lbs---067.2lbs   2/25/2015--------16.4"-------16.4"------47.8"-----45.5"-----43.2"----21.5"------269.2 lbs---068.6lbs   3/11/2015--------16.9"-------16.0"------48.9"-----44.5"-----46.0"----23.0"------273.0 lbs---064.6lbs   3/18/2015--------17.4"-------16.0"------48.2"-----44.5"-----46.0"----22.4"------275.2 lbs---062.4lbs   7/16/2015--------17.4"-------16.2"------50.5"-----48.0"-----47.0"----20.5"------297.6 lbs---[id number] tune    7/24/2015--------17.4"-------16.2"------49.7''-----46.5"-----46.0"----20.5"------281.4 lbs---016.2lbs  7/31/2015--------17.0"-------16.1"------48.4''-----44.3"-----45.4"----20.4"------278.2 lbs---019.4lbs   8/14/2015--------17.0"-------16.3"------49.2''-----46.5"-----46.0"----23.0"------282.6 lbs---015.0lbs   8/28/2015--------17.0"-------16.3"------48.0''-----45.0"-----45.0"----20.6"------269.2 lbs---028.4lbs   9/04/2015--------16.5"-------16.3"------48.0''-----43.0"-----45.0"----20.0"------268.4 lbs---029.2lbs   9/11/2015--------16.5"-------16.3"------48.0''-----43.0"-----45.0"----20.0"------273.0 lbs---029.2lbs   9/18/2015--------16.5"-------16.0"------47.4''-----44.0"-----44.2"----20.0"------271.0 lbs---031.2lbs " "  10/16/2015--------16.7"------15.5"------47.0''-----43.5"-----44.5"----21.0"------271.0 lbs---031.2lbs   02/23/2017--------17.0"------16.7"------51.0''-----47.0"-----47.9"----23.8"------293.1 lbs---09.1lbs tune up     05/31/2017--------17.3"------16.4"------52.5''-----49.6"-----47.9"----23.2"------310.0 lbs---16.1lbs tune up   06/05/2017--------17.2"------17.5"------52.0''-----49.0"-----46.3"----21.2"------303.6 lbs---22.5 lbs tune up   06/13/2017--------17.1"------17.0"------51.2''-----48.5"-----46.3"----21.8"------297.5 lbs---28.6 lbs tune up   06/20/2017--------17.1"------17.0"------51.2''-----48.5"-----46.3"----21.8"------295.0 lbs---28.6 lbs tune up  06/27/2017--------17.1"------17.0"------51.2''-----48.5"-----46.3"----21.8"------305.1 lbs---28.6 lbs tune up   BODY COMPOSITION:   DATE----------BMI----Body Fat%--------Lean Mass----------Fat Mass----Hydration Index   04/30/2014-----44.6-------34.0%------------223.0 lbs-----------114.8 lbs---------  -8.37  5/07/2014-------42.3-------38.6%------------197.1 lbs-----------123.7 lbs---------  -6.33  5/14/2014-------42.0-------32.2%------------215.5 lbs-----------102.5 lbs---------  -7.42  5/21/2014-------41.1  5/28/2014-------40.7-------31.3%------------211.9 lbs-----------096.5lbs---------  -7.02  6/04/2014-------39.6-------30.4%------------208.6 lbs-----------091.2lbs---------  -6.45  6/11/2014-------39.3-------30.2%------------207.9 lbs-----------089.9lbs---------  -6.30  6/18/2014-------38.2-------29.3%------------204.6 lbs-----------084.6lbs---------  -5.89  6/25/2014-------37.4-------34.6%------------185.3 lbs-----------098.1lbs---------  -6.17  7/02/2014-------37.1-------28.3%------------201.7 lbs-----------079.7lbs---------  -5.54  7/09/2014-------36.2-------30.0%------------191.9 lbs-----------082.1lbs---------  -5.59  7/16/2014-------36.2-------27.4%------------198.7 lbs-----------074.9lbs---------  " -3.36  7/25/2014-------36.2-------27.4%------------198.9lbs------------075.1lbs---------  -2.86  7/30/2014-------35.6-------27.9%------------194.6lbs------------075.2lbs---------  -2.93  8/08/2014-------35.8-------27.1%------------197.8lbs------------073.4lbs---------  -2.53  8/15/2014-------35.6-------27.8%------------194.5lbs------------075.1lbs---------  -3.85  12/03//2014-------36.9-------25.0%------------209.8lbs----------070.0lbs---------  -4.41      GENERAL BODY FAT %:   CLASSIFICATION------WOMEN------MEN  Essential Fat----------------10-12%--------2-4%  Athletes--------------------14-20%--------6-13%  Fitness----------------------21-24%-------14-17%  Acceptable-----------------25-31%-------18-25%  At Risk---------------------->32%---------->25%    MEAL REPLACEMENT & SUPPLEMENT NOTES:  PATIENT USING MEAL AND SUPPLEMENTS AS DIRECTED.      GOAL WT:  265 lbs.  IPP PLAN: Regular, Phase I = 13 wks, Phase II = 1 wks and Phase III = 2 wks   IPP Daily Supplements: 2 Natura Multi-Vitamins, 1 Natura Potassium Citrate, 3 Natura Omega 3, 4 Natura Jeffery-Mag, 1-2 tsp olive or grape seed extract oil, ½ tsp Ideal Salt.    WEEKLY COMMENTS & EDUCATION:Thank you for the opportunity to provide your patient with long-term wt loss management.

## 2017-07-03 ENCOUNTER — NUTRITION (OUTPATIENT)
Dept: NUTRITION | Facility: CLINIC | Age: 47
End: 2017-07-03

## 2017-07-03 VITALS
SYSTOLIC BLOOD PRESSURE: 134 MMHG | BODY MASS INDEX: 40.21 KG/M2 | WEIGHT: 303.38 LBS | HEIGHT: 73 IN | DIASTOLIC BLOOD PRESSURE: 90 MMHG

## 2017-07-03 DIAGNOSIS — Z71.3 DIETARY COUNSELING AND SURVEILLANCE: Primary | ICD-10-CM

## 2017-07-03 NOTE — PROGRESS NOTES
"IDEAL PROTEIN PROTOCOL  Weight Management Weekly Progress Record    PRIMARY PROVIDER:  Lisa Osborn DO   REFERRING PROVIDER: No ref. provider found  IDEAL PROTEIN RD/NP:  Pablo Elizabeth     MEASUREMENTS:  DATE---------Neck--------Arm-----Chest-----Waist----Hip-----Thigh---Weight---Total Lost  04/30/2014----18.3"--------17.5"-----57.7"-----52.5"-----51.9"----23.6"------337.8 lbs---0000 lbs   5/07/2014------17.9"-------16.9"------53.5"-----50.0"-----50.0"----23.6"------320.8 lbs---017.0 lbs   5/14/2014------17.5"-------16.4"------53.2"-----50.0"-----50.0"----23.6"------318.0 lbs---019.8 lbs   5/21/2014------17.0"-------16.0"------53.0"-----47.5"-----50.0"----23.0"------311.4 lbs---026.4 lbs   5/28/2014------17.0"-------16.0"------51.5"-----49.0"-----49.7"----23.0"------308.4 lbs---029.4 lbs   6/04/2014------16.8"-------16.5"------51.5"-----46.9"-----48.7"----23.2"------299.8 lbs---038.0 lbs   6/11/2014------16.7"-------16.6"------51.1"-----46.5"-----47.7"----23.0"------297.8 lbs---040.0 lbs   6/18/2014------16.7"-------16.2"------50.2"-----46.5"-----45.7"----22.5"------289.2 lbs---048.6 lbs   6/25/2014------16.5"-------16.0"------49.5"-----45.5"-----45.3"----22.4"------283.4 lbs---054.4 lbs   7/02/2014--------16.5"-------15.0"------49.5"-----45.5"-----45.3"----22.4"------281.4 lbs---056.4 lbs   7/09/2014--------16.2"-------15.0"------47.8"-----42.9"-----45.8"----22.8"------274.0 lbs---063.8 lbs   7/16/2014--------16.4"-------15.8"------48.0"-----45.0"-----45.8"----21.8"------273.6 lbs---064.2 lbs   7/25/2014--------16.2"-------15.6"------48.0"-----43.7"-----43.7"----21.0"------274.0 lbs---063.8 lbs   7/30/2014--------16.1"-------16.0"------47.5"-----44.0"-----42.7"----20.7"------269.8 lbs---068.0 lbs   8/08/2014--------16.1"-------16.2"------48.5"-----43.5"-----42.7"----22.0"------271.2 lbs---066.6lbs   8/15/2014--------16.1"-------15.8"------47.7"-----44.0"-----43.7"----21.5"------271.2 lbs---066.6lbs " "  8/20/2014--------16.5"-------15.7"------48.5"-----43.7"-----44.5"----22.0"------272.8 lbs---065.0lbs   8/03/2014--------16.8"-------16.2"------48.5"-----43.3"-----42.0"----22.0"------267.4 lbs---070.4lbs   12/3/2014--------16.5"-------16.7"------48.7"-----44.0"-----45.0"----21.0"------279.8 lbs---058.0lbs  1/21/2015--------17.6"-------17.2"------51.5"-----47.0"-----47.5"----22.0"------292.0 lbs---045.8lbs [facility] up.  1/28/2015--------17.3"-------16.9"------50.2"-----45.2"-----46.0"----22.0"------284.8 lbs---053.0lbs   2/04/2015--------16.4"-------16.7"------49.5"-----45.2"-----46.0"----22.0"------275.4 lbs---062.4lbs   2/11/2015--------16.4"-------16.5"------48.1"-----44.6"-----45.5"----21.5"------270.6 lbs---067.2lbs   2/25/2015--------16.4"-------16.4"------47.8"-----45.5"-----43.2"----21.5"------269.2 lbs---068.6lbs   3/11/2015--------16.9"-------16.0"------48.9"-----44.5"-----46.0"----23.0"------273.0 lbs---064.6lbs   3/18/2015--------17.4"-------16.0"------48.2"-----44.5"-----46.0"----22.4"------275.2 lbs---062.4lbs   7/16/2015--------17.4"-------16.2"------50.5"-----48.0"-----47.0"----20.5"------297.6 lbs---[id number] tune    7/24/2015--------17.4"-------16.2"------49.7''-----46.5"-----46.0"----20.5"------281.4 lbs---016.2lbs  7/31/2015--------17.0"-------16.1"------48.4''-----44.3"-----45.4"----20.4"------278.2 lbs---019.4lbs   8/14/2015--------17.0"-------16.3"------49.2''-----46.5"-----46.0"----23.0"------282.6 lbs---015.0lbs   8/28/2015--------17.0"-------16.3"------48.0''-----45.0"-----45.0"----20.6"------269.2 lbs---028.4lbs   9/04/2015--------16.5"-------16.3"------48.0''-----43.0"-----45.0"----20.0"------268.4 lbs---029.2lbs   9/11/2015--------16.5"-------16.3"------48.0''-----43.0"-----45.0"----20.0"------273.0 lbs---029.2lbs   9/18/2015--------16.5"-------16.0"------47.4''-----44.0"-----44.2"----20.0"------271.0 lbs---031.2lbs " "  10/16/2015--------16.7"------15.5"------47.0''-----43.5"-----44.5"----21.0"------271.0 lbs---031.2lbs   02/23/2017--------17.0"------16.7"------51.0''-----47.0"-----47.9"----23.8"------293.1 lbs---09.1lbs tune up     05/31/2017--------17.3"------16.4"------52.5''-----49.6"-----47.9"----23.2"------310.0 lbs---00.0lbs tune up   06/05/2017--------17.2"------17.5"------52.0''-----49.0"-----46.3"----21.2"------303.6 lbs---06.4 lbs tune up   06/13/2017--------17.1"------17.0"------51.2''-----48.5"-----46.3"----21.8"------297.5 lbs---12.5 lbs tune up   06/20/2017--------17.1"------17.0"------51.2''-----48.5"-----46.3"----21.8"------295.0 lbs---15.0 lbs tune up  06/27/2017--------17.1"------17.0"------51.2''-----48.5"-----46.3"----21.8"------305.1 lbs---04.9 lbs tune up   0703//2017--------17.1"------16.5"------52.0''-----45.2"-----47.0"----22.8"------303.4 lbs---06.6 lbs tune up   BODY COMPOSITION:   DATE----------BMI----Body Fat%--------Lean Mass----------Fat Mass----Hydration Index   04/30/2014-----44.6-------34.0%------------223.0 lbs-----------114.8 lbs---------  -8.37  5/07/2014-------42.3-------38.6%------------197.1 lbs-----------123.7 lbs---------  -6.33  5/14/2014-------42.0-------32.2%------------215.5 lbs-----------102.5 lbs---------  -7.42  5/21/2014-------41.1  5/28/2014-------40.7-------31.3%------------211.9 lbs-----------096.5lbs---------  -7.02  6/04/2014-------39.6-------30.4%------------208.6 lbs-----------091.2lbs---------  -6.45  6/11/2014-------39.3-------30.2%------------207.9 lbs-----------089.9lbs---------  -6.30  6/18/2014-------38.2-------29.3%------------204.6 lbs-----------084.6lbs---------  -5.89  6/25/2014-------37.4-------34.6%------------185.3 lbs-----------098.1lbs---------  -6.17  7/02/2014-------37.1-------28.3%------------201.7 lbs-----------079.7lbs---------  -5.54  7/09/2014-------36.2-------30.0%------------191.9 lbs-----------082.1lbs---------  " -5.59  7/16/2014-------36.2-------27.4%------------198.7 lbs-----------074.9lbs---------  -3.36  7/25/2014-------36.2-------27.4%------------198.9lbs------------075.1lbs---------  -2.86  7/30/2014-------35.6-------27.9%------------194.6lbs------------075.2lbs---------  -2.93  8/08/2014-------35.8-------27.1%------------197.8lbs------------073.4lbs---------  -2.53  8/15/2014-------35.6-------27.8%------------194.5lbs------------075.1lbs---------  -3.85  12/03//2014-------36.9-------25.0%------------209.8lbs----------070.0lbs---------  -4.41      GENERAL BODY FAT %:   CLASSIFICATION------WOMEN------MEN  Essential Fat----------------10-12%--------2-4%  Athletes--------------------14-20%--------6-13%  Fitness----------------------21-24%-------14-17%  Acceptable-----------------25-31%-------18-25%  At Risk---------------------->32%---------->25%    MEAL REPLACEMENT & SUPPLEMENT NOTES:  PATIENT USING MEAL AND SUPPLEMENTS AS DIRECTED.      GOAL WT:  265 lbs.  IPP PLAN: Regular, Phase I = 13 wks, Phase II = 1 wks and Phase III = 2 wks   IPP Daily Supplements: 2 Natura Multi-Vitamins, 1 Natura Potassium Citrate, 3 Natura Omega 3, 4 Natura Jeffery-Mag, 1-2 tsp olive or grape seed extract oil, ½ tsp Ideal Salt.    WEEKLY COMMENTS & EDUCATION:Thank you for the opportunity to provide your patient with long-term wt loss management.

## 2017-10-11 DIAGNOSIS — I10 HYPERTENSION GOAL BP (BLOOD PRESSURE) < 140/90: ICD-10-CM

## 2017-10-12 RX ORDER — ATENOLOL 50 MG/1
TABLET ORAL
Qty: 30 TABLET | Refills: 3 | Status: SHIPPED | OUTPATIENT
Start: 2017-10-12 | End: 2017-11-28 | Stop reason: SDUPTHER

## 2017-10-18 DIAGNOSIS — I10 HYPERTENSION GOAL BP (BLOOD PRESSURE) < 140/90: ICD-10-CM

## 2017-10-18 RX ORDER — AMLODIPINE BESYLATE 10 MG/1
TABLET ORAL
Qty: 30 TABLET | Refills: 0 | Status: SHIPPED | OUTPATIENT
Start: 2017-10-18 | End: 2017-11-28 | Stop reason: SDUPTHER

## 2017-10-18 RX ORDER — LOSARTAN POTASSIUM 100 MG/1
TABLET ORAL
Qty: 30 TABLET | Refills: 0 | Status: SHIPPED | OUTPATIENT
Start: 2017-10-18 | End: 2017-11-28 | Stop reason: SDUPTHER

## 2017-10-19 ENCOUNTER — TELEPHONE (OUTPATIENT)
Dept: INTERNAL MEDICINE | Facility: CLINIC | Age: 47
End: 2017-10-19

## 2017-10-19 NOTE — TELEPHONE ENCOUNTER
----- Message from Gabbi Gomes MD sent at 10/18/2017  9:08 PM CDT -----  Patient of Dr. Osborn  Overdue for BP recheck  Requesting refills.  Schedule with Arnav Avelar for BP recheck.  One month supply of BP meds given so he can make appt.    Dr. Gomes

## 2017-11-28 ENCOUNTER — OFFICE VISIT (OUTPATIENT)
Dept: INTERNAL MEDICINE | Facility: CLINIC | Age: 47
End: 2017-11-28
Payer: COMMERCIAL

## 2017-11-28 VITALS
HEIGHT: 73 IN | SYSTOLIC BLOOD PRESSURE: 130 MMHG | OXYGEN SATURATION: 95 % | WEIGHT: 315 LBS | DIASTOLIC BLOOD PRESSURE: 88 MMHG | HEART RATE: 72 BPM | BODY MASS INDEX: 41.75 KG/M2 | TEMPERATURE: 98 F

## 2017-11-28 DIAGNOSIS — I10 HYPERTENSION GOAL BP (BLOOD PRESSURE) < 140/90: ICD-10-CM

## 2017-11-28 DIAGNOSIS — E66.01 MORBID OBESITY WITH BMI OF 40.0-44.9, ADULT: ICD-10-CM

## 2017-11-28 DIAGNOSIS — Z00.00 ANNUAL PHYSICAL EXAM: Primary | ICD-10-CM

## 2017-11-28 PROCEDURE — 99396 PREV VISIT EST AGE 40-64: CPT | Mod: S$GLB,,, | Performed by: INTERNAL MEDICINE

## 2017-11-28 PROCEDURE — 99999 PR PBB SHADOW E&M-EST. PATIENT-LVL III: CPT | Mod: PBBFAC,,, | Performed by: INTERNAL MEDICINE

## 2017-11-28 RX ORDER — ATENOLOL 50 MG/1
50 TABLET ORAL DAILY
Qty: 30 TABLET | Refills: 11 | Status: SHIPPED | OUTPATIENT
Start: 2017-11-28 | End: 2018-12-12 | Stop reason: SDUPTHER

## 2017-11-28 RX ORDER — MULTIVITAMIN
1 TABLET ORAL DAILY
COMMUNITY

## 2017-11-28 RX ORDER — LOSARTAN POTASSIUM 100 MG/1
100 TABLET ORAL DAILY
Qty: 30 TABLET | Refills: 11 | Status: SHIPPED | OUTPATIENT
Start: 2017-11-28 | End: 2019-02-12

## 2017-11-28 RX ORDER — AMLODIPINE BESYLATE 10 MG/1
10 TABLET ORAL DAILY
Qty: 30 TABLET | Refills: 11 | Status: SHIPPED | OUTPATIENT
Start: 2017-11-28 | End: 2019-01-21 | Stop reason: SDUPTHER

## 2017-11-28 NOTE — LETTER
November 28, 2017      [facility]  [id number] [place] [address]  Phone: [phone]  Fax: [phone]       Patient: Odilon Del Rio   YOB: 1970  Date of Visit: 11/28/2017    To Whom It May Concern:    Monster Del Rio  was at [facility] on 11/28/2017. He may return to work/school on 11/29/2017 with no restrictions. If you have any questions or concerns, or if I can be of further assistance, please do not hesitate to contact me.    Sincerely,      CELSO Patricio MA

## 2017-11-28 NOTE — LETTER
November 28, 2017      [facility]  [id number] [place] [address]  Phone: [phone]  Fax: [phone]       Patient: Odilon Del Rio   YOB: 1970  Date of Visit: 11/28/2017    To Whom It May Concern:    Monster Del Rio  was at [facility] on 11/28/2017. He may return to work/school on 11/29/2017 with no restrictions. If you have any questions or concerns, or if I can be of further assistance, please do not hesitate to contact me.    Sincerely,      LUCA PatricioO

## 2017-11-28 NOTE — PROGRESS NOTES
Subjective:       Patient ID: Odilon Del Rio is a 47 y.o. male.    Chief Complaint: Annual Exam    Odilon Del Rio  47 y.o. Black or  male     Patient presents with:  Annual Exam    HPI: Here today for an annual physical. He has no significant complaints.  HTN--controlled on amlodipine, atenolol and losartan. He has gained weight due to lack of exercise and poor dieting. He is working a lot of hours and hopes this changes soon.                     LDLCALC                  116.6               02/21/2017              TETANUS VACCINE due on 09/12/1989  Lipid Panel due on 02/21/2022  Influenza Vaccine Completed    Past Medical History:  Hypertension  Morbid obesity  CHARLINE (obstructive sleep apnea)  Testosterone deficiency    History reviewed. No pertinent surgical history.    Review of patient's family history indicates:    Hypertension                   Mother                    Diabetes                       Mother                    Hypertension                   Father                      Current Outpatient Prescriptions on File Prior to Visit:  amlodipine (NORVASC) 10 MG tablet, TAKE ONE TABLET BY MOUTH ONCE DAILY, Disp: 30 tablet, Rfl: 0  atenolol (TENORMIN) 50 MG tablet, TAKE 1 TABLET BY MOUTH DAILY, Disp: 30 tablet, Rfl: 3  losartan (COZAAR) 100 MG tablet, TAKE ONE TABLET BY MOUTH ONCE DAILY, Disp: 30 tablet, Rfl: 0    Allergies:  Review of patient's allergies indicates:   -- No known drug allergies                 Review of Systems   Constitutional: Positive for activity change and fatigue. Negative for fever and unexpected weight change.   Respiratory: Negative for shortness of breath.    Cardiovascular: Negative for chest pain and leg swelling.   Gastrointestinal: Negative for abdominal pain, constipation and diarrhea.   Genitourinary: Negative for difficulty urinating and frequency.   Neurological: Negative for dizziness and headaches.   Psychiatric/Behavioral: Positive for sleep  disturbance.       Objective:      Physical Exam   Constitutional: He is oriented to person, place, and time. He appears well-developed and well-nourished. No distress.   Eyes: No scleral icterus.   Cardiovascular: Normal rate, regular rhythm and normal heart sounds.    Pulmonary/Chest: Effort normal and breath sounds normal. No respiratory distress. He has no wheezes. He has no rales.   Abdominal: Soft. Bowel sounds are normal.   Musculoskeletal: He exhibits no edema.   Neurological: He is alert and oriented to person, place, and time.   Skin: Skin is warm and dry.   Psychiatric: He has a normal mood and affect.   Vitals reviewed.      Assessment:       1. Annual physical exam    2. Hypertension goal BP (blood pressure) < 140/90    3. Morbid obesity with BMI of 40.0-44.9, adult        Plan:       Odilon was seen today for annual exam.    Diagnoses and all orders for this visit:    Annual physical exam  -     Counseled regarding age appropriate screenings and immunizations  -     Counseled regarding lifestyle modifications    Hypertension goal BP (blood pressure) < 140/90  -     losartan (COZAAR) 100 MG tablet; Take 1 tablet (100 mg total) by mouth once daily.  -     atenolol (TENORMIN) 50 MG tablet; Take 1 tablet (50 mg total) by mouth once daily.  -     amLODIPine (NORVASC) 10 MG tablet; Take 1 tablet (10 mg total) by mouth once daily.    Morbid obesity with BMI of 40.0-44.9, adult  -     Discussed lifestyle modifications (diet, exercise and weight loss)    Labs and F/U in 6 months

## 2018-12-12 DIAGNOSIS — I10 HYPERTENSION GOAL BP (BLOOD PRESSURE) < 140/90: ICD-10-CM

## 2018-12-12 RX ORDER — ATENOLOL 50 MG/1
TABLET ORAL
Qty: 30 TABLET | Refills: 0 | Status: SHIPPED | OUTPATIENT
Start: 2018-12-12 | End: 2019-01-21 | Stop reason: SDUPTHER

## 2018-12-25 DIAGNOSIS — I10 HYPERTENSION GOAL BP (BLOOD PRESSURE) < 140/90: ICD-10-CM

## 2018-12-26 RX ORDER — LOSARTAN POTASSIUM 100 MG/1
TABLET ORAL
Qty: 30 TABLET | Refills: 11 | OUTPATIENT
Start: 2018-12-26

## 2018-12-26 RX ORDER — AMLODIPINE BESYLATE 10 MG/1
TABLET ORAL
Qty: 30 TABLET | Refills: 11 | OUTPATIENT
Start: 2018-12-26

## 2019-01-21 DIAGNOSIS — I10 HYPERTENSION GOAL BP (BLOOD PRESSURE) < 140/90: ICD-10-CM

## 2019-01-21 RX ORDER — ATENOLOL 50 MG/1
50 TABLET ORAL DAILY
Qty: 30 TABLET | Refills: 0 | Status: SHIPPED | OUTPATIENT
Start: 2019-01-21 | End: 2019-02-12 | Stop reason: SDUPTHER

## 2019-01-21 RX ORDER — AMLODIPINE BESYLATE 10 MG/1
10 TABLET ORAL DAILY
Qty: 30 TABLET | Refills: 0 | Status: SHIPPED | OUTPATIENT
Start: 2019-01-21 | End: 2019-02-12 | Stop reason: SDUPTHER

## 2019-01-21 NOTE — TELEPHONE ENCOUNTER
----- Message from Viviana Gusman sent at 1/21/2019  8:49 AM CST -----  Contact: self   Patient already has an appointment with Dr. Osborn scheduled but he does not have any remaining BP medication. Requesting enough medication until appointment on 2/12/19.Please call back at [phone]      1. What is the name of the medication you are requesting? bp medication  2. What is the dose? n/a  3. How do you take the medication? Orally, topically, etc? orally  4. How often do you take this medication? n/a  5. Do you need a 30 day or 90 day supply? n/a  6. How many refills are you requesting? N/a\  7. What is your preferred pharmacy and location of the pharmacy?   [facility]/pharmacy #[id number] - [place] - [address] [facility]  [address] [facility] [address]  Phone: [phone] Fax: [phone]      8. Who can we contact with further questions? self

## 2019-02-12 ENCOUNTER — OFFICE VISIT (OUTPATIENT)
Dept: INTERNAL MEDICINE | Facility: CLINIC | Age: 49
End: 2019-02-12
Payer: COMMERCIAL

## 2019-02-12 VITALS
TEMPERATURE: 98 F | BODY MASS INDEX: 41.63 KG/M2 | HEART RATE: 71 BPM | DIASTOLIC BLOOD PRESSURE: 74 MMHG | OXYGEN SATURATION: 96 % | WEIGHT: 314.13 LBS | SYSTOLIC BLOOD PRESSURE: 128 MMHG | HEIGHT: 73 IN

## 2019-02-12 DIAGNOSIS — I10 HYPERTENSION GOAL BP (BLOOD PRESSURE) < 140/90: ICD-10-CM

## 2019-02-12 DIAGNOSIS — E66.01 MORBID OBESITY WITH BMI OF 40.0-44.9, ADULT: ICD-10-CM

## 2019-02-12 DIAGNOSIS — Z00.00 ANNUAL PHYSICAL EXAM: Primary | ICD-10-CM

## 2019-02-12 DIAGNOSIS — E29.1 HYPOGONADISM MALE: ICD-10-CM

## 2019-02-12 PROCEDURE — 3074F SYST BP LT 130 MM HG: CPT | Mod: CPTII,S$GLB,, | Performed by: INTERNAL MEDICINE

## 2019-02-12 PROCEDURE — 3078F PR MOST RECENT DIASTOLIC BLOOD PRESSURE < 80 MM HG: ICD-10-PCS | Mod: CPTII,S$GLB,, | Performed by: INTERNAL MEDICINE

## 2019-02-12 PROCEDURE — 3074F PR MOST RECENT SYSTOLIC BLOOD PRESSURE < 130 MM HG: ICD-10-PCS | Mod: CPTII,S$GLB,, | Performed by: INTERNAL MEDICINE

## 2019-02-12 PROCEDURE — 99396 PREV VISIT EST AGE 40-64: CPT | Mod: S$GLB,,, | Performed by: INTERNAL MEDICINE

## 2019-02-12 PROCEDURE — 99999 PR PBB SHADOW E&M-EST. PATIENT-LVL III: CPT | Mod: PBBFAC,,, | Performed by: INTERNAL MEDICINE

## 2019-02-12 PROCEDURE — 99396 PR PREVENTIVE VISIT,EST,40-64: ICD-10-PCS | Mod: S$GLB,,, | Performed by: INTERNAL MEDICINE

## 2019-02-12 PROCEDURE — 99999 PR PBB SHADOW E&M-EST. PATIENT-LVL III: ICD-10-PCS | Mod: PBBFAC,,, | Performed by: INTERNAL MEDICINE

## 2019-02-12 PROCEDURE — 3078F DIAST BP <80 MM HG: CPT | Mod: CPTII,S$GLB,, | Performed by: INTERNAL MEDICINE

## 2019-02-12 RX ORDER — AMLODIPINE BESYLATE 10 MG/1
10 TABLET ORAL DAILY
Qty: 30 TABLET | Refills: 11 | Status: SHIPPED | OUTPATIENT
Start: 2019-02-12 | End: 2020-03-02

## 2019-02-12 RX ORDER — ATENOLOL 50 MG/1
50 TABLET ORAL DAILY
Qty: 30 TABLET | Refills: 11 | Status: SHIPPED | OUTPATIENT
Start: 2019-02-12 | End: 2020-03-02

## 2019-02-12 NOTE — PROGRESS NOTES
Subjective:       Patient ID: Odilon Del Rio is a 48 y.o. male.    Chief Complaint: Annual Exam    Odilon Del Rio  48 y.o. Black or  male     Patient presents with:  Annual Exam    HPI: Here today for an annual physical. He has no significant complaints.  HTN--stable. He has been taking atenolol and amlodipine. He stopped losartan (due to recall) a couple of weeks ago. He has also made lifestyle changes and has lost weight over the past month. He is following a low carb diet and has lost 23 pounds.                       LDLCALC                  116.6               02/21/2017            Hypogonadism--he is not currently on a supplement. He would like to have his levels checked.     Influenza Vaccine due on 02/12/2020  Lipid Panel due on 02/21/2022  TETANUS VACCINE due on 11/28/2027    Past Medical History:  Hypertension  Morbid obesity  CHARLINE (obstructive sleep apnea)  Testosterone deficiency    History reviewed. No pertinent surgical history.    Review of patient's family history indicates:  Problem: Hypertension      Relation: Mother          Age of Onset: (Not Specified)  Problem: Diabetes      Relation: Mother          Age of Onset: (Not Specified)  Problem: Hypertension      Relation: Father          Age of Onset: (Not Specified)      Current Outpatient Medications on File Prior to Visit:  multivitamin (THERAGRAN) per tablet, Take 1 tablet by mouth once daily., Disp: , Rfl:   atenolol (TENORMIN) 50 MG tablet, Take 1 tablet (50 mg total) by mouth once daily., Disp: 30 tablet, Rfl: 0  amLODIPine (NORVASC) 10 MG tablet, Take 1 tablet (10 mg total) by mouth once daily., Disp: 30 tablet, Rfl: 0    Allergies:  Review of patient's allergies indicates:   -- No known drug allergies                 Review of Systems   Constitutional: Positive for fatigue. Negative for fever and unexpected weight change.   Respiratory: Negative for shortness of breath.    Cardiovascular: Negative for chest pain  and leg swelling.   Gastrointestinal: Positive for constipation (at times). Negative for abdominal pain and diarrhea.   Genitourinary: Negative for difficulty urinating.   Musculoskeletal: Positive for back pain (considering seeing a chiropractor or PT). Negative for gait problem.   Neurological: Negative for dizziness and headaches.       Objective:      Physical Exam   Constitutional: He is oriented to person, place, and time. He appears well-developed and well-nourished. No distress.   Eyes: No scleral icterus.   Neck: No tracheal deviation present. No thyromegaly present.   Cardiovascular: Normal rate, regular rhythm and normal heart sounds.   Pulmonary/Chest: Effort normal and breath sounds normal. No respiratory distress. He has no wheezes. He has no rales.   Abdominal: Soft. Bowel sounds are normal.   Musculoskeletal: He exhibits no edema.   Lymphadenopathy:     He has no cervical adenopathy.   Neurological: He is alert and oriented to person, place, and time.   Skin: Skin is warm and dry.   Psychiatric: He has a normal mood and affect.   Vitals reviewed.      Assessment:       1. Annual physical exam    2. Hypertension goal BP (blood pressure) < 140/90    3. Hypogonadism male    4. Morbid obesity with BMI of 40.0-44.9, adult        Plan:       Odilon was seen today for annual exam.    Diagnoses and all orders for this visit:    Annual physical exam  -     Counseled regarding age appropriate screenings and immunizations  -     Counseled regarding lifestyle modifications  -     CBC auto differential; Future  -     Comprehensive metabolic panel; Future  -     TSH; Future  -     Lipid panel; Future  -     TESTOSTERONE PANEL; Future    Hypertension goal BP (blood pressure) < 140/90  -     Refill atenolol (TENORMIN) 50 MG tablet; Take 1 tablet (50 mg total) by mouth once daily.  -     Refill amLODIPine (NORVASC) 10 MG tablet; Take 1 tablet (10 mg total) by mouth once daily.    Hypogonadism male  -     Check  testosterone panel    Morbid obesity with BMI of 40.0-44.9, adult  -     Lifestyle modifications discussed    RTC in 4 weeks for b/p check.

## 2019-02-12 NOTE — LETTER
February 12, 2019                 [facility]  Internal Medicine  [address] [place] [address]  Phone: [phone]  Fax: [phone]   February 12, 2019     Patient: Odilon Del Rio   YOB: 1970   Date of Visit: 2/12/2019       To Whom it May Concern:    Odilon Del Rio was seen in my clinic on 2/12/2019. He may return to work on 02/13/19.    If you have any questions or concerns, please don't hesitate to call.    Sincerely,           Paula Silva LPN

## 2019-02-20 ENCOUNTER — LAB VISIT (OUTPATIENT)
Dept: LAB | Facility: HOSPITAL | Age: 49
End: 2019-02-20
Attending: INTERNAL MEDICINE
Payer: COMMERCIAL

## 2019-02-20 DIAGNOSIS — Z00.00 ANNUAL PHYSICAL EXAM: ICD-10-CM

## 2019-02-20 LAB
ALBUMIN SERPL BCP-MCNC: 3.6 G/DL
ALP SERPL-CCNC: 48 U/L
ALT SERPL W/O P-5'-P-CCNC: 26 U/L
ANION GAP SERPL CALC-SCNC: 7 MMOL/L
AST SERPL-CCNC: 24 U/L
BASOPHILS # BLD AUTO: 0.04 K/UL
BASOPHILS NFR BLD: 0.7 %
BILIRUB SERPL-MCNC: 0.7 MG/DL
BUN SERPL-MCNC: 19 MG/DL
CALCIUM SERPL-MCNC: 9.6 MG/DL
CHLORIDE SERPL-SCNC: 107 MMOL/L
CHOLEST SERPL-MCNC: 175 MG/DL
CHOLEST/HDLC SERPL: 4 {RATIO}
CO2 SERPL-SCNC: 24 MMOL/L
CREAT SERPL-MCNC: 1 MG/DL
DIFFERENTIAL METHOD: ABNORMAL
EOSINOPHIL # BLD AUTO: 0.1 K/UL
EOSINOPHIL NFR BLD: 1.3 %
ERYTHROCYTE [DISTWIDTH] IN BLOOD BY AUTOMATED COUNT: 12.1 %
EST. GFR  (AFRICAN AMERICAN): >60 ML/MIN/1.73 M^2
EST. GFR  (NON AFRICAN AMERICAN): >60 ML/MIN/1.73 M^2
GLUCOSE SERPL-MCNC: 85 MG/DL
HCT VFR BLD AUTO: 41.5 %
HDLC SERPL-MCNC: 44 MG/DL
HDLC SERPL: 25.1 %
HGB BLD-MCNC: 13.1 G/DL
IMM GRANULOCYTES # BLD AUTO: 0.01 K/UL
IMM GRANULOCYTES NFR BLD AUTO: 0.2 %
LDLC SERPL CALC-MCNC: 117.8 MG/DL
LYMPHOCYTES # BLD AUTO: 2 K/UL
LYMPHOCYTES NFR BLD: 37.8 %
MCH RBC QN AUTO: 31.2 PG
MCHC RBC AUTO-ENTMCNC: 31.6 G/DL
MCV RBC AUTO: 99 FL
MONOCYTES # BLD AUTO: 0.5 K/UL
MONOCYTES NFR BLD: 9.3 %
NEUTROPHILS # BLD AUTO: 2.7 K/UL
NEUTROPHILS NFR BLD: 50.7 %
NONHDLC SERPL-MCNC: 131 MG/DL
NRBC BLD-RTO: 0 /100 WBC
PLATELET # BLD AUTO: 196 K/UL
PMV BLD AUTO: 11.8 FL
POTASSIUM SERPL-SCNC: 4.6 MMOL/L
PROT SERPL-MCNC: 7 G/DL
RBC # BLD AUTO: 4.2 M/UL
SODIUM SERPL-SCNC: 138 MMOL/L
TRIGL SERPL-MCNC: 66 MG/DL
TSH SERPL DL<=0.005 MIU/L-ACNC: 0.87 UIU/ML
WBC # BLD AUTO: 5.4 K/UL

## 2019-02-20 PROCEDURE — 84443 ASSAY THYROID STIM HORMONE: CPT

## 2019-02-20 PROCEDURE — 80053 COMPREHEN METABOLIC PANEL: CPT

## 2019-02-20 PROCEDURE — 80061 LIPID PANEL: CPT

## 2019-02-20 PROCEDURE — 82040 ASSAY OF SERUM ALBUMIN: CPT

## 2019-02-20 PROCEDURE — 85025 COMPLETE CBC W/AUTO DIFF WBC: CPT

## 2019-02-24 LAB
ALBUMIN SERPL-MCNC: 4 G/DL (ref 3.6–5.1)
SHBG SERPL-SCNC: 28 NMOL/L (ref 10–50)
TESTOST FREE SERPL-MCNC: 61.9 PG/ML (ref 46–224)
TESTOST SERPL-MCNC: 399 NG/DL (ref 250–1100)
TESTOSTERONE.FREE+WB SERPL-MCNC: 113.9 NG/DL (ref 110–575)

## 2019-02-27 ENCOUNTER — TELEPHONE (OUTPATIENT)
Dept: INTERNAL MEDICINE | Facility: CLINIC | Age: 49
End: 2019-02-27

## 2019-02-27 NOTE — TELEPHONE ENCOUNTER
----- Message from Lisa Osborn DO sent at 2/27/2019  1:24 PM CST -----  Notify patient CBC shows mild stable anemia. If he is feeling fatigued, he can take an OTC iron supplement.   Thyroid test and lipid panel are within normal range.   CMP does not show any significant abnormal findings.   Testosterone panel is within normal range.

## 2019-03-06 ENCOUNTER — TELEPHONE (OUTPATIENT)
Dept: PULMONOLOGY | Facility: CLINIC | Age: 49
End: 2019-03-06

## 2019-03-06 DIAGNOSIS — G47.30 SLEEP DISORDER BREATHING: Primary | ICD-10-CM

## 2019-03-08 ENCOUNTER — OFFICE VISIT (OUTPATIENT)
Dept: PULMONOLOGY | Facility: CLINIC | Age: 49
End: 2019-03-08
Payer: COMMERCIAL

## 2019-03-08 ENCOUNTER — HOSPITAL ENCOUNTER (OUTPATIENT)
Dept: RADIOLOGY | Facility: HOSPITAL | Age: 49
Discharge: HOME | End: 2019-03-08
Attending: INTERNAL MEDICINE
Payer: COMMERCIAL

## 2019-03-08 VITALS
WEIGHT: 306.88 LBS | BODY MASS INDEX: 40.67 KG/M2 | HEIGHT: 73 IN | HEART RATE: 66 BPM | RESPIRATION RATE: 18 BRPM | DIASTOLIC BLOOD PRESSURE: 80 MMHG | OXYGEN SATURATION: 95 % | SYSTOLIC BLOOD PRESSURE: 122 MMHG

## 2019-03-08 DIAGNOSIS — G47.33 OSA ON CPAP: Primary | ICD-10-CM

## 2019-03-08 DIAGNOSIS — I10 HYPERTENSION GOAL BP (BLOOD PRESSURE) < 140/90: ICD-10-CM

## 2019-03-08 DIAGNOSIS — G47.30 SLEEP DISORDER BREATHING: ICD-10-CM

## 2019-03-08 PROCEDURE — 3008F PR BODY MASS INDEX (BMI) DOCUMENTED: ICD-10-PCS | Mod: CPTII,S$GLB,, | Performed by: INTERNAL MEDICINE

## 2019-03-08 PROCEDURE — 3079F PR MOST RECENT DIASTOLIC BLOOD PRESSURE 80-89 MM HG: ICD-10-PCS | Mod: CPTII,S$GLB,, | Performed by: INTERNAL MEDICINE

## 2019-03-08 PROCEDURE — 99999 PR PBB SHADOW E&M-EST. PATIENT-LVL III: ICD-10-PCS | Mod: PBBFAC,,, | Performed by: INTERNAL MEDICINE

## 2019-03-08 PROCEDURE — 3079F DIAST BP 80-89 MM HG: CPT | Mod: CPTII,S$GLB,, | Performed by: INTERNAL MEDICINE

## 2019-03-08 PROCEDURE — 71046 XR CHEST PA AND LATERAL: ICD-10-PCS | Mod: 26,,, | Performed by: RADIOLOGY

## 2019-03-08 PROCEDURE — 3074F SYST BP LT 130 MM HG: CPT | Mod: CPTII,S$GLB,, | Performed by: INTERNAL MEDICINE

## 2019-03-08 PROCEDURE — 99204 PR OFFICE/OUTPT VISIT, NEW, LEVL IV, 45-59 MIN: ICD-10-PCS | Mod: S$GLB,,, | Performed by: INTERNAL MEDICINE

## 2019-03-08 PROCEDURE — 99999 PR PBB SHADOW E&M-EST. PATIENT-LVL III: CPT | Mod: PBBFAC,,, | Performed by: INTERNAL MEDICINE

## 2019-03-08 PROCEDURE — 71046 X-RAY EXAM CHEST 2 VIEWS: CPT | Mod: 26,,, | Performed by: RADIOLOGY

## 2019-03-08 PROCEDURE — 99204 OFFICE O/P NEW MOD 45 MIN: CPT | Mod: S$GLB,,, | Performed by: INTERNAL MEDICINE

## 2019-03-08 PROCEDURE — 3074F PR MOST RECENT SYSTOLIC BLOOD PRESSURE < 130 MM HG: ICD-10-PCS | Mod: CPTII,S$GLB,, | Performed by: INTERNAL MEDICINE

## 2019-03-08 PROCEDURE — 3008F BODY MASS INDEX DOCD: CPT | Mod: CPTII,S$GLB,, | Performed by: INTERNAL MEDICINE

## 2019-03-08 PROCEDURE — 71046 X-RAY EXAM CHEST 2 VIEWS: CPT | Mod: TC

## 2019-03-08 NOTE — PROGRESS NOTES
Sleep Apnea Consultation    Patient Odilon Del Rio, referred by Self for a consultation.    PCP Lisa Osborn    Vital Signs:   Vitals:    03/08/19 0804   BP: 122/80   Pulse: 66   Resp: 18        Chief Complaint:  CHARLINE.    History of Present Illness:  The patient is referred to establish care for obstructive sleep apnea.   Also  needs compliance letter.  for [employer]  Patient is new to me previously seen Dr. Tc Draper.  Sleep report from 2006 March was reviewed  Obstructive sleep apnea moderate without AHI of 16.8 events per hour.  Patient had 118 events.  Patient was titrated to CPAP 16 cm water pressure.    Subsequently he has lost weight and pressure was titrated down to 11 cm water pressure  He comes to me to establish care.  Is currently on a CPAP 11 cm water pressure which he is using well.  Errol score is 5.  He works as  with oral company.  Bedtime is 8-10 p.m. wake-up time 4:00 a.m..  DME sleep apnea store  No snoring no daytime sleepiness no sleepy driving.  Other comorbidities reviewed include hypertension.   SDI: bed time 0945pm, SOL 2-3 mins, Wake time 0445am       Family History:   Family History   Problem Relation Age of Onset    Hypertension Mother     Diabetes Mother     Hypertension Father         Social History:   Social History     Substance and Sexual Activity   Alcohol Use No    Alcohol/week: 0.0 oz   ,   Social History     Tobacco Use   Smoking Status Never Smoker   Smokeless Tobacco Never Used   ,   Social History     Substance and Sexual Activity   Drug Use Not on file        Medications/Allergies:   Current Outpatient Medications:     amLODIPine (NORVASC) 10 MG tablet, Take 1 tablet (10 mg total) by mouth once daily., Disp: 30 tablet, Rfl: 11    atenolol (TENORMIN) 50 MG tablet, Take 1 tablet (50 mg total) by mouth once daily., Disp: 30 tablet, Rfl: 11    multivitamin (THERAGRAN) per tablet, Take 1 tablet by mouth once daily., Disp: , Rfl: ,  "  Review of patient's allergies indicates:   Allergen Reactions    No known drug allergies       Review of Systems   All other systems reviewed and are negative.    Vitals:    03/08/19 0804   BP: 122/80   Pulse: 66   Resp: 18   SpO2: 95%   Weight: (!) 139.2 kg (306 lb 14.1 oz)   Height: 6' 1" (1.854 m)       Physical Examination:      Physical Exam   Constitutional: He is oriented to person, place, and time. He appears well-developed and well-nourished. No distress.   HENT:   Head: Normocephalic and atraumatic.   Nose: Nose normal.   Mouth/Throat: Oropharynx is clear and moist. No oropharyngeal exudate.   malampati score 4   Eyes: EOM are normal. Pupils are equal, round, and reactive to light.   Neck: Normal range of motion. Neck supple.   Neck 19"   Cardiovascular: Normal rate, regular rhythm, normal heart sounds and intact distal pulses.   No murmur heard.  Pulmonary/Chest: Effort normal and breath sounds normal. No stridor. No respiratory distress. He has no wheezes.   Abdominal: Soft. Bowel sounds are normal.   Musculoskeletal: Normal range of motion. He exhibits no edema.   Neurological: He is alert and oriented to person, place, and time. No cranial nerve deficit.   Skin: Skin is warm and dry. Capillary refill takes 2 to 3 seconds. He is not diaphoretic.   Psychiatric: He has a normal mood and affect.   Nursing note and vitals reviewed.    CXR  FINDINGS:  The cardiac and mediastinal silhouettes appear within normal limits.   The lungs are clear bilaterally.  No acute osseous findings demonstrated.    Download  Clinician:  Shanae Hewitt CRT  [address] [facility] [place] [address]  2/5/2019 - 3/6/2019  YOB: 1970  Mask:  Compliance Summary  2/5/2019 - 3/6/2019 (30 days)  Days with Device Usage 28 days  Days without Device Usage 2 days  Percent Days with Device Usage 93.3%  Cumulative Usage 4 days 23 hrs. 27 mins. 21 secs.  Maximum Usage (1 Day) 6 hrs. 30 mins. 15 secs.  Average " Usage (All Days) 3 hrs. 58 mins. 54 secs.  Average Usage (Days Used) 4 hrs. 15 mins. 58 secs.  Minimum Usage (1 Day) 40 mins. 1 secs.  Percent of Days with Usage >= 4 Hours 60.0%  Percent of Days with Usage < 4 Hours 40.0%  Date Range  Total Blower Time 4 days 23 hrs. 27 mins. 21 se    CXR      Impression:      Problem List Items Addressed This Visit     Hypertension goal BP (blood pressure) < 140/90    BMI 40.0-44.9, adult     General weight loss/lifestyle modification strategies discussed (elicit support from others; identify saboteurs; non-food rewards).  Diet interventions: low calorie (1000 kCal/d) deficit diet           CHARLINE on CPAP - Primary     CPAP 11 cm  Dream wear nasall mask  Sleep apnea store           Relevant Orders    MyChart Patient Entered CPAP Usage    CPAP/BIPAP SUPPLIES          This is a 48 y.o.-year-old, Black or  male with Moderate CHARLINE which is treated and stable.    We have discussed weight loss and how this may improve his situation.  We have discussed behavioral modifications, as well.       Follow-up in about 1 year (around 3/8/2020), or adherence with CPAP, weight loss, Digital montior, for Sign up my [facility], Weight loss and exercise, declined required immunisations.    This note was prepared using voice recognition system and is likely to have sound alike errors that may have been overlooked even after proof reading.  Please call me with any questions    Discussed diagnosis, its evaluation, treatment and usual course. All questions answered.    Thank you for the courtesy of participating in the care of this patient    Gumaro Ferguson MD

## 2020-02-29 DIAGNOSIS — I10 HYPERTENSION GOAL BP (BLOOD PRESSURE) < 140/90: ICD-10-CM

## 2020-03-02 RX ORDER — ATENOLOL 50 MG/1
TABLET ORAL
Qty: 30 TABLET | Refills: 0 | Status: SHIPPED | OUTPATIENT
Start: 2020-03-02 | End: 2020-04-01

## 2020-03-02 RX ORDER — AMLODIPINE BESYLATE 10 MG/1
TABLET ORAL
Qty: 30 TABLET | Refills: 0 | Status: SHIPPED | OUTPATIENT
Start: 2020-03-02 | End: 2020-04-01

## 2020-03-02 NOTE — TELEPHONE ENCOUNTER
Patient requesting refills but is overdue for an annual exam.    Please schedule.   Refill approved x 1.

## 2020-03-11 ENCOUNTER — TELEPHONE (OUTPATIENT)
Dept: INTERNAL MEDICINE | Facility: CLINIC | Age: 50
End: 2020-03-11

## 2020-03-11 NOTE — TELEPHONE ENCOUNTER
----- Message from Gabbi Ortiz sent at 3/11/2020 12:12 PM CDT -----  Contact: self  Type:  Sooner Apoointment Request    Caller is requesting a sooner appointment.  Caller declined first available appointment listed below.  Caller will not accept being placed on the waitlist and is requesting a message be sent to doctor.  Name of Caller:Odilon  When is the first available appointment?04/06/2020  Symptoms:wellness check  Would the patient rather a call back or a response via [facility]? call  Best Call Back Number:[phone]  Additional Information: pt would like to be seen on 03/17/2020 if possible.

## 2020-03-17 ENCOUNTER — OFFICE VISIT (OUTPATIENT)
Dept: SLEEP MEDICINE | Facility: CLINIC | Age: 50
End: 2020-03-17
Payer: COMMERCIAL

## 2020-03-17 VITALS
HEIGHT: 73 IN | SYSTOLIC BLOOD PRESSURE: 126 MMHG | OXYGEN SATURATION: 95 % | BODY MASS INDEX: 41.75 KG/M2 | WEIGHT: 315 LBS | HEART RATE: 81 BPM | RESPIRATION RATE: 18 BRPM | DIASTOLIC BLOOD PRESSURE: 84 MMHG

## 2020-03-17 DIAGNOSIS — G47.33 OSA ON CPAP: ICD-10-CM

## 2020-03-17 PROCEDURE — 99999 PR PBB SHADOW E&M-EST. PATIENT-LVL III: ICD-10-PCS | Mod: PBBFAC,,, | Performed by: NURSE PRACTITIONER

## 2020-03-17 PROCEDURE — 3008F BODY MASS INDEX DOCD: CPT | Mod: CPTII,S$GLB,, | Performed by: NURSE PRACTITIONER

## 2020-03-17 PROCEDURE — 3074F PR MOST RECENT SYSTOLIC BLOOD PRESSURE < 130 MM HG: ICD-10-PCS | Mod: CPTII,S$GLB,, | Performed by: NURSE PRACTITIONER

## 2020-03-17 PROCEDURE — 3079F PR MOST RECENT DIASTOLIC BLOOD PRESSURE 80-89 MM HG: ICD-10-PCS | Mod: CPTII,S$GLB,, | Performed by: NURSE PRACTITIONER

## 2020-03-17 PROCEDURE — 99214 OFFICE O/P EST MOD 30 MIN: CPT | Mod: S$GLB,,, | Performed by: NURSE PRACTITIONER

## 2020-03-17 PROCEDURE — 3008F PR BODY MASS INDEX (BMI) DOCUMENTED: ICD-10-PCS | Mod: CPTII,S$GLB,, | Performed by: NURSE PRACTITIONER

## 2020-03-17 PROCEDURE — 3079F DIAST BP 80-89 MM HG: CPT | Mod: CPTII,S$GLB,, | Performed by: NURSE PRACTITIONER

## 2020-03-17 PROCEDURE — 99999 PR PBB SHADOW E&M-EST. PATIENT-LVL III: CPT | Mod: PBBFAC,,, | Performed by: NURSE PRACTITIONER

## 2020-03-17 PROCEDURE — 3074F SYST BP LT 130 MM HG: CPT | Mod: CPTII,S$GLB,, | Performed by: NURSE PRACTITIONER

## 2020-03-17 PROCEDURE — 99214 PR OFFICE/OUTPT VISIT, EST, LEVL IV, 30-39 MIN: ICD-10-PCS | Mod: S$GLB,,, | Performed by: NURSE PRACTITIONER

## 2020-03-17 NOTE — LETTER
March 17, 2020                   [facility]  [address]  [place] [address]  Phone: [phone]  Fax: [phone] March 17, 2020     Patient: Odilon Del Rio    YOB: 1970   Date of Visit: 3/17/2020       To Whom It May Concern:     Odilon Del Rio has CHARLINE on CPAP. He is compliant with CPAP. No daytime sleepiness. No work restrictions with continued compliance.    If you have any questions or concerns, please don't hesitate to call.    Sincerely,          Elizabeth Lejeune, NP

## 2020-03-17 NOTE — PROGRESS NOTES
"Subjective:      Patient ID: Odilon Del Rio is a 49 y.o. male.    Chief Complaint: Sleep Apnea    HPI  Presents to office for review of CPAP therapy. Patient states improved symptoms with use of CPAP. Sleeping more soundly. Waking up feeling more refreshed. Improved daytime sleepiness. Patient states he is benefiting from use of the CPAP. He gained 30lb since last year.    No fever, chills, or hemoptysis. No pleuritic type chest pain. Breathing is stable as compared to 6 months ago.  [place] Sleepiness scale score: 2     Patient Active Problem List   Diagnosis    Hypertension goal BP (blood pressure) < 140/90    BMI 40.0-44.9, adult    Hypogonadism male    CHARLINE on CPAP         /84   Pulse 81   Resp 18   Ht 6' 1" (1.854 m)   Wt (!) 151.8 kg (334 lb 10.5 oz)   SpO2 95%   BMI 44.15 kg/m²   Body mass index is 44.15 kg/m².    Review of Systems   Constitutional: Negative.    HENT: Negative.    Respiratory: Negative.    Cardiovascular: Negative.    Musculoskeletal: Negative.    Gastrointestinal: Negative.    Neurological: Negative.    Psychiatric/Behavioral: Negative.      Objective:      Physical Exam   Constitutional: He is oriented to person, place, and time. He appears well-developed and well-nourished.   Obese   HENT:   Head: Normocephalic and atraumatic.   Mallampati Score: IV   Neck: Normal range of motion. Neck supple.   Cardiovascular: Normal rate and regular rhythm.   Pulmonary/Chest: Effort normal and breath sounds normal.   Abdominal: Soft. There is no tenderness.   Musculoskeletal: He exhibits no edema.   Neurological: He is alert and oriented to person, place, and time.   Skin: Skin is warm and dry.   Psychiatric: He has a normal mood and affect.     Personal Diagnostic Review  Review of PAP download. Special study media link attached to this encounter. He is compliant.           Assessment:       1. CHARLINE on CPAP    2. BMI 40.0-44.9, adult        Outpatient Encounter Medications as of " 3/17/2020   Medication Sig Dispense Refill    amLODIPine (NORVASC) 10 MG tablet Take 1 tablet by mouth once daily 30 tablet 0    atenoloL (TENORMIN) 50 MG tablet Take 1 tablet by mouth once daily 30 tablet 0    multivitamin (THERAGRAN) per tablet Take 1 tablet by mouth once daily.       No facility-administered encounter medications on file as of 3/17/2020.      Orders Placed This Encounter   Procedures    CPAP/BIPAP SUPPLIES     90 day supply with 3 refills.     Order Specific Question:   Type of mask:     Answer:   Nasal     Order Specific Question:   Headgear?     Answer:   Yes     Order Specific Question:   Tubing?     Answer:   Yes     Order Specific Question:   Humidifier chamber?     Answer:   Yes     Order Specific Question:   Chin strap?     Answer:   Yes     Order Specific Question:   Filters?     Answer:   Yes     Order Specific Question:   Length of need (1-99 months):     Answer:   99     Plan:        Problem List Items Addressed This Visit        Endocrine    BMI 40.0-44.9, adult    Current Assessment & Plan     Weight loss and exercise to improve overall health.              Other    CHARLINE on CPAP    Overview     CPAP 11cm H20.         Relevant Orders    CPAP/BIPAP SUPPLIES      Doing well on PAP settings. Patient is compliant. Follow up in 12 months with PAP data download or call earlier if any problems.

## 2020-04-01 ENCOUNTER — TELEPHONE (OUTPATIENT)
Dept: PULMONOLOGY | Facility: CLINIC | Age: 50
End: 2020-04-01

## 2020-04-01 DIAGNOSIS — I10 HYPERTENSION GOAL BP (BLOOD PRESSURE) < 140/90: ICD-10-CM

## 2020-04-01 RX ORDER — ATENOLOL 50 MG/1
TABLET ORAL
Qty: 30 TABLET | Refills: 0 | Status: SHIPPED | OUTPATIENT
Start: 2020-04-01 | End: 2020-05-05 | Stop reason: SDUPTHER

## 2020-04-01 RX ORDER — AMLODIPINE BESYLATE 10 MG/1
TABLET ORAL
Qty: 30 TABLET | Refills: 0 | Status: SHIPPED | OUTPATIENT
Start: 2020-04-01 | End: 2020-05-05 | Stop reason: SDUPTHER

## 2020-04-01 NOTE — TELEPHONE ENCOUNTER
----- Message from aSndy Gomes sent at 4/1/2020  8:30 AM CDT -----  Contact: Patient   Odilon needs to get a print out of his CPAP reading, Please call him at [phone].    Thanks  Td

## 2020-04-06 ENCOUNTER — PATIENT MESSAGE (OUTPATIENT)
Dept: INTERNAL MEDICINE | Facility: CLINIC | Age: 50
End: 2020-04-06

## 2020-04-06 ENCOUNTER — OFFICE VISIT (OUTPATIENT)
Dept: INTERNAL MEDICINE | Facility: CLINIC | Age: 50
End: 2020-04-06
Payer: COMMERCIAL

## 2020-04-06 VITALS — TEMPERATURE: 99 F | SYSTOLIC BLOOD PRESSURE: 128 MMHG | DIASTOLIC BLOOD PRESSURE: 84 MMHG

## 2020-04-06 DIAGNOSIS — I10 HYPERTENSION GOAL BP (BLOOD PRESSURE) < 140/90: Primary | ICD-10-CM

## 2020-04-06 PROCEDURE — 3079F PR MOST RECENT DIASTOLIC BLOOD PRESSURE 80-89 MM HG: ICD-10-PCS | Mod: CPTII,,, | Performed by: INTERNAL MEDICINE

## 2020-04-06 PROCEDURE — 3074F PR MOST RECENT SYSTOLIC BLOOD PRESSURE < 130 MM HG: ICD-10-PCS | Mod: CPTII,,, | Performed by: INTERNAL MEDICINE

## 2020-04-06 PROCEDURE — 3074F SYST BP LT 130 MM HG: CPT | Mod: CPTII,,, | Performed by: INTERNAL MEDICINE

## 2020-04-06 PROCEDURE — 99213 OFFICE O/P EST LOW 20 MIN: CPT | Mod: GT,,, | Performed by: INTERNAL MEDICINE

## 2020-04-06 PROCEDURE — 99213 PR OFFICE/OUTPT VISIT, EST, LEVL III, 20-29 MIN: ICD-10-PCS | Mod: GT,,, | Performed by: INTERNAL MEDICINE

## 2020-04-06 PROCEDURE — 3079F DIAST BP 80-89 MM HG: CPT | Mod: CPTII,,, | Performed by: INTERNAL MEDICINE

## 2020-04-06 NOTE — PROGRESS NOTES
Subjective:       Patient ID: Odilon Del Rio is a 49 y.o. male.    Chief Complaint: Follow-up    Odilon Del Rio  49 y.o. Black or  male    Patient presents with:  Follow-up    HPI: Presents for a video visit.   The patient location is: in a parked car at work   The chief complaint leading to consultation is: annual exam  Visit type: Virtual visit with synchronous audio and video  Total time spent with patient: [id number]  Each patient to whom he or she provides medical services by telemedicine is:  (1) informed of the relationship between the physician and patient and the respective role of any other health care provider with respect to management of the patient; and (2) notified that he or she may decline to receive medical services by telemedicine and may withdraw from such care at any time.    HTN--compliant with amlodipine and atenolol. He had his b/p checked recently at an employment physical and it was 128/84. He denies symptoms.     Past Medical History:  Hypertension  Morbid obesity  CHARLINE (obstructive sleep apnea)  Testosterone deficiency    Current Outpatient Medications on File Prior to Visit:  amLODIPine (NORVASC) 10 MG tablet, Take 1 tablet by mouth once daily, Disp: 30 tablet, Rfl: 0  atenoloL (TENORMIN) 50 MG tablet, Take 1 tablet by mouth once daily, Disp: 30 tablet, Rfl: 0  multivitamin (THERAGRAN) per tablet, Take 1 tablet by mouth once daily., Disp: , Rfl:     Allergies:  Review of patient's allergies indicates:   -- No known drug allergies       Review of Systems   Constitutional: Negative for activity change, fever and unexpected weight change.   HENT: Negative for hearing loss, rhinorrhea and trouble swallowing.    Eyes: Negative for discharge and visual disturbance.   Respiratory: Negative for cough, chest tightness and wheezing.    Cardiovascular: Negative for chest pain and palpitations.   Gastrointestinal: Negative for blood in stool, constipation, diarrhea and  vomiting.   Endocrine: Negative for polydipsia and polyuria.   Genitourinary: Negative for difficulty urinating, hematuria and urgency.   Musculoskeletal: Negative for arthralgias, joint swelling and neck pain.   Neurological: Negative for weakness and headaches.   Psychiatric/Behavioral: Negative for confusion and dysphoric mood.       Objective:      Physical Exam   Constitutional: He is oriented to person, place, and time. He appears well-developed and well-nourished. No distress.   Pulmonary/Chest: Effort normal. No respiratory distress.   Neurological: He is alert and oriented to person, place, and time.   Psychiatric: He has a normal mood and affect. His behavior is normal. Judgment and thought content normal.   Vitals reviewed.      Assessment:       1. Hypertension goal BP (blood pressure) < 140/90        Plan:       Odilon was seen today for follow-up.    Diagnoses and all orders for this visit:    Hypertension goal BP (blood pressure) < 140/90  -     Continue current management  -     Lipid panel; Future  -     Comprehensive metabolic panel; Future  -     TSH; Future  -     CBC auto differential; Future    Schedule labs.     RTC annually and as needed.

## 2020-04-07 ENCOUNTER — PATIENT MESSAGE (OUTPATIENT)
Dept: SLEEP MEDICINE | Facility: CLINIC | Age: 50
End: 2020-04-07

## 2020-04-07 ENCOUNTER — TELEPHONE (OUTPATIENT)
Dept: PULMONOLOGY | Facility: CLINIC | Age: 50
End: 2020-04-07

## 2020-04-07 NOTE — TELEPHONE ENCOUNTER
----- Message from Gabbi Ortiz sent at 4/7/2020  9:39 AM CDT -----  Contact: self  Pt requesting a call back regarding information sent through [employer]. He is needing the forms filled out as soon as possible so that he can return to work. Please call pt back at [phone]

## 2020-04-07 NOTE — TELEPHONE ENCOUNTER
Spoke with patient, faxed forms over to destination with receipt of confirmation. Patient verbalized understanding.

## 2020-05-04 DIAGNOSIS — I10 HYPERTENSION GOAL BP (BLOOD PRESSURE) < 140/90: ICD-10-CM

## 2020-05-05 ENCOUNTER — PATIENT MESSAGE (OUTPATIENT)
Dept: INTERNAL MEDICINE | Facility: CLINIC | Age: 50
End: 2020-05-05

## 2020-05-05 DIAGNOSIS — I10 HYPERTENSION GOAL BP (BLOOD PRESSURE) < 140/90: ICD-10-CM

## 2020-05-05 RX ORDER — AMLODIPINE BESYLATE 10 MG/1
10 TABLET ORAL DAILY
Qty: 90 TABLET | Refills: 3 | Status: SHIPPED | OUTPATIENT
Start: 2020-05-05 | End: 2021-04-06 | Stop reason: SDUPTHER

## 2020-05-05 RX ORDER — ATENOLOL 50 MG/1
TABLET ORAL
Qty: 30 TABLET | Refills: 0 | OUTPATIENT
Start: 2020-05-05

## 2020-05-05 RX ORDER — AMLODIPINE BESYLATE 10 MG/1
TABLET ORAL
Qty: 30 TABLET | Refills: 0 | OUTPATIENT
Start: 2020-05-05

## 2020-05-05 RX ORDER — ATENOLOL 50 MG/1
50 TABLET ORAL DAILY
Qty: 90 TABLET | Refills: 3 | Status: SHIPPED | OUTPATIENT
Start: 2020-05-05 | End: 2021-04-06 | Stop reason: SDUPTHER

## 2020-05-27 ENCOUNTER — PATIENT MESSAGE (OUTPATIENT)
Dept: INTERNAL MEDICINE | Facility: CLINIC | Age: 50
End: 2020-05-27

## 2020-05-28 ENCOUNTER — LAB VISIT (OUTPATIENT)
Dept: LAB | Facility: HOSPITAL | Age: 50
End: 2020-05-28
Attending: INTERNAL MEDICINE
Payer: COMMERCIAL

## 2020-05-28 ENCOUNTER — OFFICE VISIT (OUTPATIENT)
Dept: INTERNAL MEDICINE | Facility: CLINIC | Age: 50
End: 2020-05-28
Payer: COMMERCIAL

## 2020-05-28 ENCOUNTER — OFFICE VISIT (OUTPATIENT)
Dept: UROLOGY | Facility: CLINIC | Age: 50
End: 2020-05-28
Payer: COMMERCIAL

## 2020-05-28 VITALS
OXYGEN SATURATION: 96 % | RESPIRATION RATE: 16 BRPM | SYSTOLIC BLOOD PRESSURE: 132 MMHG | HEIGHT: 73 IN | HEART RATE: 66 BPM | BODY MASS INDEX: 41.75 KG/M2 | TEMPERATURE: 98 F | WEIGHT: 315 LBS | DIASTOLIC BLOOD PRESSURE: 88 MMHG

## 2020-05-28 VITALS
SYSTOLIC BLOOD PRESSURE: 138 MMHG | BODY MASS INDEX: 45.39 KG/M2 | WEIGHT: 315 LBS | TEMPERATURE: 98 F | DIASTOLIC BLOOD PRESSURE: 82 MMHG

## 2020-05-28 DIAGNOSIS — I10 HYPERTENSION GOAL BP (BLOOD PRESSURE) < 140/90: ICD-10-CM

## 2020-05-28 DIAGNOSIS — N52.9 ERECTILE DYSFUNCTION, UNSPECIFIED ERECTILE DYSFUNCTION TYPE: ICD-10-CM

## 2020-05-28 DIAGNOSIS — Z12.5 PROSTATE CANCER SCREENING: Primary | ICD-10-CM

## 2020-05-28 DIAGNOSIS — E29.1 HYPOGONADISM IN MALE: ICD-10-CM

## 2020-05-28 DIAGNOSIS — I10 HYPERTENSION GOAL BP (BLOOD PRESSURE) < 140/90: Primary | ICD-10-CM

## 2020-05-28 LAB
BASOPHILS # BLD AUTO: 0.05 K/UL (ref 0–0.2)
BASOPHILS NFR BLD: 1 % (ref 0–1.9)
BILIRUB SERPL-MCNC: NORMAL MG/DL
BLOOD URINE, POC: NORMAL
COLOR, POC UA: YELLOW
DIFFERENTIAL METHOD: ABNORMAL
EOSINOPHIL # BLD AUTO: 0.1 K/UL (ref 0–0.5)
EOSINOPHIL NFR BLD: 1.7 % (ref 0–8)
ERYTHROCYTE [DISTWIDTH] IN BLOOD BY AUTOMATED COUNT: 11.9 % (ref 11.5–14.5)
GLUCOSE UR QL STRIP: NORMAL
HCT VFR BLD AUTO: 45.8 % (ref 40–54)
HGB BLD-MCNC: 14 G/DL (ref 14–18)
IMM GRANULOCYTES # BLD AUTO: 0.01 K/UL (ref 0–0.04)
IMM GRANULOCYTES NFR BLD AUTO: 0.2 % (ref 0–0.5)
KETONES UR QL STRIP: NORMAL
LEUKOCYTE ESTERASE URINE, POC: NORMAL
LYMPHOCYTES # BLD AUTO: 2.4 K/UL (ref 1–4.8)
LYMPHOCYTES NFR BLD: 45.1 % (ref 18–48)
MCH RBC QN AUTO: 30.5 PG (ref 27–31)
MCHC RBC AUTO-ENTMCNC: 30.6 G/DL (ref 32–36)
MCV RBC AUTO: 100 FL (ref 82–98)
MONOCYTES # BLD AUTO: 0.6 K/UL (ref 0.3–1)
MONOCYTES NFR BLD: 11.5 % (ref 4–15)
NEUTROPHILS # BLD AUTO: 2.1 K/UL (ref 1.8–7.7)
NEUTROPHILS NFR BLD: 40.5 % (ref 38–73)
NITRITE, POC UA: NORMAL
NRBC BLD-RTO: 0 /100 WBC
PH, POC UA: 5
PLATELET # BLD AUTO: 173 K/UL (ref 150–350)
PMV BLD AUTO: 12.1 FL (ref 9.2–12.9)
PROTEIN, POC: NORMAL
RBC # BLD AUTO: 4.59 M/UL (ref 4.6–6.2)
SPECIFIC GRAVITY, POC UA: 1.15
UROBILINOGEN, POC UA: NORMAL
WBC # BLD AUTO: 5.21 K/UL (ref 3.9–12.7)

## 2020-05-28 PROCEDURE — 80061 LIPID PANEL: CPT

## 2020-05-28 PROCEDURE — 3008F BODY MASS INDEX DOCD: CPT | Mod: CPTII,S$GLB,, | Performed by: UROLOGY

## 2020-05-28 PROCEDURE — 81002 URINALYSIS NONAUTO W/O SCOPE: CPT | Mod: S$GLB,,, | Performed by: UROLOGY

## 2020-05-28 PROCEDURE — 84443 ASSAY THYROID STIM HORMONE: CPT

## 2020-05-28 PROCEDURE — 3075F SYST BP GE 130 - 139MM HG: CPT | Mod: CPTII,S$GLB,, | Performed by: PHYSICIAN ASSISTANT

## 2020-05-28 PROCEDURE — 99999 PR PBB SHADOW E&M-EST. PATIENT-LVL III: ICD-10-PCS | Mod: PBBFAC,,, | Performed by: UROLOGY

## 2020-05-28 PROCEDURE — 3075F PR MOST RECENT SYSTOLIC BLOOD PRESS GE 130-139MM HG: ICD-10-PCS | Mod: CPTII,S$GLB,, | Performed by: UROLOGY

## 2020-05-28 PROCEDURE — 80053 COMPREHEN METABOLIC PANEL: CPT

## 2020-05-28 PROCEDURE — 99213 OFFICE O/P EST LOW 20 MIN: CPT | Mod: S$GLB,,, | Performed by: PHYSICIAN ASSISTANT

## 2020-05-28 PROCEDURE — 3079F PR MOST RECENT DIASTOLIC BLOOD PRESSURE 80-89 MM HG: ICD-10-PCS | Mod: CPTII,S$GLB,, | Performed by: PHYSICIAN ASSISTANT

## 2020-05-28 PROCEDURE — 3008F PR BODY MASS INDEX (BMI) DOCUMENTED: ICD-10-PCS | Mod: CPTII,S$GLB,, | Performed by: UROLOGY

## 2020-05-28 PROCEDURE — 99999 PR PBB SHADOW E&M-EST. PATIENT-LVL IV: CPT | Mod: PBBFAC,,, | Performed by: PHYSICIAN ASSISTANT

## 2020-05-28 PROCEDURE — 81002 POCT URINE DIPSTICK WITHOUT MICROSCOPE: ICD-10-PCS | Mod: S$GLB,,, | Performed by: UROLOGY

## 2020-05-28 PROCEDURE — 3008F PR BODY MASS INDEX (BMI) DOCUMENTED: ICD-10-PCS | Mod: CPTII,S$GLB,, | Performed by: PHYSICIAN ASSISTANT

## 2020-05-28 PROCEDURE — 99204 OFFICE O/P NEW MOD 45 MIN: CPT | Mod: 25,S$GLB,, | Performed by: UROLOGY

## 2020-05-28 PROCEDURE — 99999 PR PBB SHADOW E&M-EST. PATIENT-LVL IV: ICD-10-PCS | Mod: PBBFAC,,, | Performed by: PHYSICIAN ASSISTANT

## 2020-05-28 PROCEDURE — 36415 COLL VENOUS BLD VENIPUNCTURE: CPT

## 2020-05-28 PROCEDURE — 3008F BODY MASS INDEX DOCD: CPT | Mod: CPTII,S$GLB,, | Performed by: PHYSICIAN ASSISTANT

## 2020-05-28 PROCEDURE — 99213 PR OFFICE/OUTPT VISIT, EST, LEVL III, 20-29 MIN: ICD-10-PCS | Mod: S$GLB,,, | Performed by: PHYSICIAN ASSISTANT

## 2020-05-28 PROCEDURE — 85025 COMPLETE CBC W/AUTO DIFF WBC: CPT

## 2020-05-28 PROCEDURE — 3079F DIAST BP 80-89 MM HG: CPT | Mod: CPTII,S$GLB,, | Performed by: UROLOGY

## 2020-05-28 PROCEDURE — 3079F PR MOST RECENT DIASTOLIC BLOOD PRESSURE 80-89 MM HG: ICD-10-PCS | Mod: CPTII,S$GLB,, | Performed by: UROLOGY

## 2020-05-28 PROCEDURE — 99999 PR PBB SHADOW E&M-EST. PATIENT-LVL III: CPT | Mod: PBBFAC,,, | Performed by: UROLOGY

## 2020-05-28 PROCEDURE — 3075F PR MOST RECENT SYSTOLIC BLOOD PRESS GE 130-139MM HG: ICD-10-PCS | Mod: CPTII,S$GLB,, | Performed by: PHYSICIAN ASSISTANT

## 2020-05-28 PROCEDURE — 3079F DIAST BP 80-89 MM HG: CPT | Mod: CPTII,S$GLB,, | Performed by: PHYSICIAN ASSISTANT

## 2020-05-28 PROCEDURE — 3075F SYST BP GE 130 - 139MM HG: CPT | Mod: CPTII,S$GLB,, | Performed by: UROLOGY

## 2020-05-28 PROCEDURE — 99204 PR OFFICE/OUTPT VISIT, NEW, LEVL IV, 45-59 MIN: ICD-10-PCS | Mod: 25,S$GLB,, | Performed by: UROLOGY

## 2020-05-28 RX ORDER — TADALAFIL 10 MG/1
10 TABLET ORAL DAILY PRN
Qty: 30 TABLET | Refills: 11 | Status: SHIPPED | OUTPATIENT
Start: 2020-05-28 | End: 2021-06-04 | Stop reason: SDUPTHER

## 2020-05-28 NOTE — LETTER
May 28, 2020      [facility]  [address] [address]  Phone: [phone]  Fax: [phone]       Patient: Odilon Del Rio   YOB: 1970  Date of Visit: 05/28/2020    To Whom It May Concern:    Monster Del Rio  was at [facility] on 05/28/2020. He may return to work/school on 5/28/20 with no restrictions. If you have any questions or concerns, or if I can be of further assistance, please do not hesitate to contact me.    Sincerely,    Veronica Villafana PA-C

## 2020-05-28 NOTE — LETTER
May 28, 2020      Veronica Villafana PA-C  [address] [facility] [address] [place] [address]           [facility]  [address] [place] [address]  Phone: [phone]  Fax: [phone]          Patient: Odilon Del Rio   MR Number: [id number]   YOB: 1970   Date of Visit: 5/28/2020       Dear Veronica Villafana:    Thank you for referring Odilon Del Rio to me for evaluation. Attached you will find relevant portions of my assessment and plan of care.    If you have questions, please do not hesitate to call me. I look forward to following Odilon Del Rio along with you.    Sincerely,    Kishore Iraheta IV, MD    Enclosure  CC:  No Recipients    If you would like to receive this communication electronically, please contact [email or web address] or [phone] to request more information on [employer] Link access.    For providers and/or their staff who would like to refer a patient to [facility], please contact us through our one-stop-shop provider referral line, [facility], at [phone].    If you feel you have received this communication in error or would no longer like to receive these types of communications, please e-mail [email or web address]

## 2020-05-28 NOTE — PROGRESS NOTES
Subjective:      Patient ID: Odilon Del Rio is a 49 y.o. male.    Chief Complaint: Medication Refill    Patient is new to me, being seen today for medication request (cialis).  Patient feels he has had on and off sexual dysfunction for quite some time now.  Reports it is becoming more of a problem.  Reports weak orgasm and shortened erection time.  Reports low testosterone in the past and took otc testosterone supplements but d/c d/t short temper on medication.  Most recent testosterone labs are from 2/2019 within normal range.  Denies urinary symptoms.     H/o HTN.  Patient reports he does not have a cuff at home, but uses parents cuff sometimes and readings usually 130s/80-90.      Patient admits over the past month he has noticed some depression and decreased pleasure in things that he normally enjoys.  He reports decreased motivation.  These mood symptoms do seem to coincide with ED to an extent as he carries some guilt not being able to perform sexually with his girlfriend, feels he is disappointing her and this has affected his mood.     Review of Systems   Constitutional: Negative for chills, diaphoresis and fever.   HENT: Negative for congestion, rhinorrhea and sore throat.    Respiratory: Negative for cough, shortness of breath and wheezing.    Cardiovascular: Negative for chest pain and leg swelling.   Gastrointestinal: Negative for abdominal pain, constipation, diarrhea, nausea and vomiting.   Genitourinary: Negative for decreased urine volume, difficulty urinating, dysuria, frequency, hematuria and urgency.        +ED   Skin: Negative for rash.   Neurological: Negative for dizziness, light-headedness and headaches.   Psychiatric/Behavioral: Positive for dysphoric mood. Negative for self-injury and sleep disturbance. The patient is not nervous/anxious.        Objective:   /88 (BP Location: Left arm, Patient Position: Sitting, BP Method: Large (Manual))   Pulse 66   Temp 98.4 °F (36.9 °C)  "(Tympanic)   Resp 16   Ht 6' 1" (1.854 m)   Wt (!) 156.1 kg (344 lb 2.2 oz)   SpO2 96%   BMI 45.40 kg/m²   Physical Exam   Constitutional: He appears well-developed and well-nourished. He does not appear ill. No distress.   HENT:   Head: Normocephalic and atraumatic.   Cardiovascular: Normal rate, regular rhythm and normal heart sounds.   No murmur heard.  Pulmonary/Chest: Effort normal and breath sounds normal. No respiratory distress. He has no decreased breath sounds.   Skin: Skin is warm, dry and intact. No rash noted.   Psychiatric: He has a normal mood and affect. His speech is normal and behavior is normal. Thought content normal.     Assessment:      1. Hypertension goal BP (blood pressure) < 140/90    2. Hypogonadism in male    3. Erectile dysfunction, unspecified erectile dysfunction type       Plan:   Hypertension goal BP (blood pressure) < 140/90   Buy cuff and monitor BP at home, send log after 1wk   Stressed importance of BP control should he begin any medications for ED    Hypogonadism in male  -     Ambulatory referral/consult to Endocrinology; Future; Expected date: 06/04/2020  -     Ambulatory referral/consult to Urology; Future; Expected date: 06/04/2020      Discussed treatment of depression symptoms, including medication, cognitive therapy and behavior.  Stressed importance of staying active daily.     Feel depressive symptoms are at least partially affected by increased sexual dysfunction.  He declines medication at this time but will revisit if symptoms persist following Urology work up/eval and treatment.    Discussed worsening signs/symptoms and when to return to clinic or go to ED.   Patient expresses understanding and agrees with treatment plan.   "

## 2020-05-28 NOTE — LETTER
May 28, 2020      [facility]  [address] [address]  Phone: [phone]  Fax: [phone]       Patient: Odilon Del Rio   YOB: 1970  Date of Visit: 05/28/2020    To Whom It May Concern:    Monster Del Rio  was at [facility] on 05/28/2020. He may return to work/school on 5/28/19 with no restrictions. If you have any questions or concerns, or if I can be of further assistance, please do not hesitate to contact me.    Sincerely,    Veronica Villafana PA-C

## 2020-05-28 NOTE — PROGRESS NOTES
Chief Complaint: Hypogonadism?    HPI:   5/28/20: 50 yo man referred for low T workup.  Uses CPAP.  No abd/pelvic pain and no exac/rel factors.  No hematuria.  No urolithiasis.  No urinary bother.  No  history.  Normal sexual function.  Dropped 70 lbs a few years ago and put it back on.  No family CaP.  T was 400 with a normal free T value calculated.  Feels low desire to do anything, ED is the main .    Allergies:  No known drug allergies    Medications:  has a current medication list which includes the following prescription(s): amlodipine, atenolol, and multivitamin.    Review of Systems:  General: No fever, chills, fatigability, or weight loss.  Skin: No rashes, itching, or changes in color or texture of skin.  Chest: Denies LONG, cyanosis, wheezing, cough, and sputum production.  Abdomen: Appetite fine. No weight loss. Denies diarrhea, abdominal pain, hematemesis, or blood in stool.  Musculoskeletal: No joint stiffness or swelling. Denies back pain.  : As above.  All other review of systems negative.    PMH:   has a past medical history of Hypertension, Morbid obesity, CHARLINE (obstructive sleep apnea), and Testosterone deficiency.    PSH:   has no past surgical history on file.    FamHx: family history includes Diabetes in his mother; Hypertension in his father and mother.    SocHx:  reports that he has never smoked. He has never used smokeless tobacco. He reports that he does not drink alcohol. His drug history is not on file.      Physical Exam:  Vitals:    05/28/20 1403   BP: 138/82   Temp: 98.4 °F (36.9 °C)     General: A&Ox3, no apparent distress, no deformities  Neck: No masses, normal thyroid  Lungs: normal inspiration, no use of accessory muscles  Heart: normal pulse, no arrhythmias  Abdomen: Soft, NT, ND, no masses, no hernias, no hepatosplenomegaly  Lymphatic: Neck and groin nodes negative  Skin: The skin is warm and dry. No jaundice.  Ext: No c/c/e.  : Test desc wilmer, no abnormalities of  epididymus. Penis normal, with normal penile and scrotal skin. Meatus normal. EDSON normal.    Labs/Studies:   Urinalysis performed in clinic, summary: UA normal    Impression/Plan:   1. Obesity.  Not interested in bariatric surgery.  2. T not indicated based on prior labs; will draw again  3. Cialis 10mg as needed  4. Low risk of prostate cancer. PSA today.  Same in 1 year.

## 2020-05-29 LAB
ALBUMIN SERPL BCP-MCNC: 3.7 G/DL (ref 3.5–5.2)
ALP SERPL-CCNC: 59 U/L (ref 55–135)
ALT SERPL W/O P-5'-P-CCNC: 28 U/L (ref 10–44)
ANION GAP SERPL CALC-SCNC: 8 MMOL/L (ref 8–16)
AST SERPL-CCNC: 29 U/L (ref 10–40)
BILIRUB SERPL-MCNC: 0.9 MG/DL (ref 0.1–1)
BUN SERPL-MCNC: 14 MG/DL (ref 6–20)
CALCIUM SERPL-MCNC: 9.7 MG/DL (ref 8.7–10.5)
CHLORIDE SERPL-SCNC: 107 MMOL/L (ref 95–110)
CHOLEST SERPL-MCNC: 196 MG/DL (ref 120–199)
CHOLEST/HDLC SERPL: 4.4 {RATIO} (ref 2–5)
CO2 SERPL-SCNC: 25 MMOL/L (ref 23–29)
CREAT SERPL-MCNC: 1 MG/DL (ref 0.5–1.4)
EST. GFR  (AFRICAN AMERICAN): >60 ML/MIN/1.73 M^2
EST. GFR  (NON AFRICAN AMERICAN): >60 ML/MIN/1.73 M^2
GLUCOSE SERPL-MCNC: 80 MG/DL (ref 70–110)
HDLC SERPL-MCNC: 45 MG/DL (ref 40–75)
HDLC SERPL: 23 % (ref 20–50)
LDLC SERPL CALC-MCNC: 132 MG/DL (ref 63–159)
NONHDLC SERPL-MCNC: 151 MG/DL
POTASSIUM SERPL-SCNC: 4.7 MMOL/L (ref 3.5–5.1)
PROT SERPL-MCNC: 7.5 G/DL (ref 6–8.4)
SODIUM SERPL-SCNC: 140 MMOL/L (ref 136–145)
TRIGL SERPL-MCNC: 95 MG/DL (ref 30–150)
TSH SERPL DL<=0.005 MIU/L-ACNC: 0.88 UIU/ML (ref 0.4–4)

## 2020-06-01 ENCOUNTER — TELEPHONE (OUTPATIENT)
Dept: UROLOGY | Facility: CLINIC | Age: 50
End: 2020-06-01

## 2020-06-08 ENCOUNTER — TELEPHONE (OUTPATIENT)
Dept: UROLOGY | Facility: CLINIC | Age: 50
End: 2020-06-08

## 2020-06-25 ENCOUNTER — OFFICE VISIT (OUTPATIENT)
Dept: ENDOCRINOLOGY | Facility: CLINIC | Age: 50
End: 2020-06-25
Payer: COMMERCIAL

## 2020-06-25 VITALS
HEART RATE: 81 BPM | WEIGHT: 315 LBS | BODY MASS INDEX: 41.75 KG/M2 | RESPIRATION RATE: 18 BRPM | HEIGHT: 73 IN | SYSTOLIC BLOOD PRESSURE: 142 MMHG | DIASTOLIC BLOOD PRESSURE: 92 MMHG

## 2020-06-25 DIAGNOSIS — I10 HYPERTENSION, UNSPECIFIED TYPE: Primary | ICD-10-CM

## 2020-06-25 DIAGNOSIS — R79.89 LOW TESTOSTERONE: ICD-10-CM

## 2020-06-25 DIAGNOSIS — E29.1 HYPOGONADISM IN MALE: ICD-10-CM

## 2020-06-25 PROCEDURE — 99999 PR PBB SHADOW E&M-EST. PATIENT-LVL IV: ICD-10-PCS | Mod: PBBFAC,,, | Performed by: INTERNAL MEDICINE

## 2020-06-25 PROCEDURE — 3077F PR MOST RECENT SYSTOLIC BLOOD PRESSURE >= 140 MM HG: ICD-10-PCS | Mod: CPTII,S$GLB,, | Performed by: INTERNAL MEDICINE

## 2020-06-25 PROCEDURE — 3080F DIAST BP >= 90 MM HG: CPT | Mod: CPTII,S$GLB,, | Performed by: INTERNAL MEDICINE

## 2020-06-25 PROCEDURE — 3008F PR BODY MASS INDEX (BMI) DOCUMENTED: ICD-10-PCS | Mod: CPTII,S$GLB,, | Performed by: INTERNAL MEDICINE

## 2020-06-25 PROCEDURE — 99204 PR OFFICE/OUTPT VISIT, NEW, LEVL IV, 45-59 MIN: ICD-10-PCS | Mod: S$GLB,,, | Performed by: INTERNAL MEDICINE

## 2020-06-25 PROCEDURE — 3080F PR MOST RECENT DIASTOLIC BLOOD PRESSURE >= 90 MM HG: ICD-10-PCS | Mod: CPTII,S$GLB,, | Performed by: INTERNAL MEDICINE

## 2020-06-25 PROCEDURE — 99204 OFFICE O/P NEW MOD 45 MIN: CPT | Mod: S$GLB,,, | Performed by: INTERNAL MEDICINE

## 2020-06-25 PROCEDURE — 3008F BODY MASS INDEX DOCD: CPT | Mod: CPTII,S$GLB,, | Performed by: INTERNAL MEDICINE

## 2020-06-25 PROCEDURE — 3077F SYST BP >= 140 MM HG: CPT | Mod: CPTII,S$GLB,, | Performed by: INTERNAL MEDICINE

## 2020-06-25 PROCEDURE — 99999 PR PBB SHADOW E&M-EST. PATIENT-LVL IV: CPT | Mod: PBBFAC,,, | Performed by: INTERNAL MEDICINE

## 2020-06-25 NOTE — LETTER
June 25, 2020      Veronica Villafana PA-C  [address] [facility] [address] [place] [address]           [facility]  [address]  [place] [address]  Phone: [phone]  Fax: [phone]          Patient: Odilon Del Rio   MR Number: [id number]   YOB: 1970   Date of Visit: 6/25/2020       Dear Veronica Villafana:    Thank you for referring Odilon Del Rio to me for evaluation. Attached you will find relevant portions of my assessment and plan of care.    If you have questions, please do not hesitate to call me. I look forward to following Odilon Del Rio along with you.    Sincerely,    Omayra Alarcon MD    Enclosure  CC:  No Recipients    If you would like to receive this communication electronically, please contact [email or web address] or [phone] to request more information on [employer] Link access.    For providers and/or their staff who would like to refer a patient to [facility], please contact us through our one-stop-shop provider referral line, [facility] , at [phone].    If you feel you have received this communication in error or would no longer like to receive these types of communications, please e-mail [email or web address]

## 2020-06-25 NOTE — PROGRESS NOTES
Referring Provider:  Veronica Villafana, *     PCP:  Lisa Osborn DO    Reason for referral:   Hypogonadism in male    Odilon Del Rio 49 y.o. male    CC:  Abnormal blood test     HPI:  Lab Results   Component Value Date    TESTOSTERONE 271 05/28/2020    TESTOSTERONE 42.5 (L) 05/28/2020     Patient was referred to urology and to endocrine.  Pt was found to have low testosterone of 271.  No history of treatment with testosterone.  Patient has been feeling lack of desire to do anything.  He wanted to stay home.  He has no complaints of erectile dysfunction.  There is a history of hypertension.  No history of trauma to testicle.  No complaints of dysphagia, chest pain, shortness of breath, nausea, vomiting, edema, or rash.    Past Medical History:   Diagnosis Date    Hypertension     Morbid obesity     CHARLINE (obstructive sleep apnea)     Testosterone deficiency        History reviewed. No pertinent surgical history.    Social History     Socioeconomic History    Marital status:      Spouse name: Not on file    Number of children: Not on file    Years of education: Not on file    Highest education level: Not on file   Occupational History     Employer: [employer]   Social Needs    Financial resource strain: Not hard at all    Food insecurity     Worry: Never true     Inability: Never true    Transportation needs     Medical: No     Non-medical: No   Tobacco Use    Smoking status: Never Smoker    Smokeless tobacco: Never Used   Substance and Sexual Activity    Alcohol use: No     Alcohol/week: 0.0 standard drinks     Frequency: Monthly or less     Drinks per session: 1 or 2     Binge frequency: Never    Drug use: Not on file    Sexual activity: Not on file   Lifestyle    Physical activity     Days per week: 3 days     Minutes per session: 30 min    Stress: Not at all   Relationships    Social connections     Talks on phone: More than three times a week     Gets together: Never      Attends Hinduism service: Not on file     Active member of club or organization: No     Attends meetings of clubs or organizations: Never     Relationship status:    Other Topics Concern    Not on file   Social History Narrative    Not on file         ROS:   No thyroid problem  No diabetes  No testicle problem  No ED  ROS otherwise neg except for what is mentioned in the PMH, PSH and HPI    PE:  Vitals:    06/25/20 0926   BP: (!) 142/92   Pulse: 81   Resp: 18    Blood pressure repeated 136/92  Alert and oriented  No acute distress  No Proptosis or conjunctivitis  Nose nl  No rash on tongue  No goitre by inspection  Thyroid gland is not palpable  No cervical lymphadenopathy  Heart reg, no gallop  Lungs cta, no wheezing  Abd soft, no tnd  +Pitting edema in lower legs  Speech normal  Behavior normal  No tremor  Morbid obesity  Body mass index is 46.19 kg/m².      Lab:    Lab Results   Component Value Date    TESTOSTERONE 271 05/28/2020    TESTOSTERONE 42.5 (L) 05/28/2020       Lab Results   Component Value Date    TSH 0.883 05/28/2020       Lab Results   Component Value Date    CHOL 196 05/28/2020    TRIG 95 05/28/2020    HDL 45 05/28/2020    CHOLHDL 23.0 05/28/2020    TOTALCHOLEST 4.4 05/28/2020    NONHDLCHOL 151 05/28/2020     BMP  Lab Results   Component Value Date     05/28/2020    K 4.7 05/28/2020     05/28/2020    CO2 25 05/28/2020    BUN 14 05/28/2020    CREATININE 1.0 05/28/2020    CALCIUM 9.7 05/28/2020    ANIONGAP 8 05/28/2020    ESTGFRAFRICA >60.0 05/28/2020    EGFRNONAA >60.0 05/28/2020       A/P:  Low testosterone in the afternoon  Serum testosterone was fine in 2019  Rule out hypogonadism  Morbid obesity is a factor in the low testosterone   Blood test to be repeated in the morning for the following  -     Testosterone Panel; Future; Expected date: 06/25/2020  -     T4, free; Future; Expected date: 06/25/2020  -     Luteinizing hormone; Future; Expected date: 06/25/2020  -      Follicle stimulating hormone; Future; Expected date: 06/25/2020  -     Prolactin; Future; Expected date: 06/25/2020  Hypertension, unspecified type  Patient is advised to monitor his blood pressure      Appt in 2 weeks.  Pt understands the plan and instructions.

## 2020-06-25 NOTE — PATIENT INSTRUCTIONS
Monitor your Bp at least weekly for now.  Contacy your PCP if Diastolic Bp will remain above 90 next week.

## 2020-07-02 ENCOUNTER — LAB VISIT (OUTPATIENT)
Dept: LAB | Facility: HOSPITAL | Age: 50
End: 2020-07-02
Attending: INTERNAL MEDICINE
Payer: COMMERCIAL

## 2020-07-02 DIAGNOSIS — R79.89 LOW TESTOSTERONE: ICD-10-CM

## 2020-07-02 LAB
FSH SERPL-ACNC: 2.1 MIU/ML (ref 0.95–11.95)
LH SERPL-ACNC: 2.8 MIU/ML (ref 0.6–12.1)
PROLACTIN SERPL IA-MCNC: 7.6 NG/ML (ref 3.5–19.4)
T4 FREE SERPL-MCNC: 0.87 NG/DL (ref 0.71–1.51)

## 2020-07-02 PROCEDURE — 83001 ASSAY OF GONADOTROPIN (FSH): CPT

## 2020-07-02 PROCEDURE — 83002 ASSAY OF GONADOTROPIN (LH): CPT

## 2020-07-02 PROCEDURE — 84146 ASSAY OF PROLACTIN: CPT

## 2020-07-02 PROCEDURE — 82040 ASSAY OF SERUM ALBUMIN: CPT

## 2020-07-02 PROCEDURE — 36415 COLL VENOUS BLD VENIPUNCTURE: CPT

## 2020-07-02 PROCEDURE — 84439 ASSAY OF FREE THYROXINE: CPT

## 2020-07-11 LAB
ALBUMIN SERPL-MCNC: 4.1 G/DL (ref 3.6–5.1)
SHBG SERPL-SCNC: 22 NMOL/L (ref 10–50)
TESTOST FREE SERPL-MCNC: 60.3 PG/ML (ref 46–224)
TESTOST SERPL-MCNC: 333 NG/DL (ref 250–1100)
TESTOSTERONE.FREE+WB SERPL-MCNC: 113.6 NG/DL (ref 110–575)

## 2020-07-17 ENCOUNTER — OFFICE VISIT (OUTPATIENT)
Dept: ENDOCRINOLOGY | Facility: CLINIC | Age: 50
End: 2020-07-17
Payer: COMMERCIAL

## 2020-07-17 DIAGNOSIS — I10 HYPERTENSION GOAL BP (BLOOD PRESSURE) < 140/90: Primary | ICD-10-CM

## 2020-07-17 PROCEDURE — 99213 OFFICE O/P EST LOW 20 MIN: CPT | Mod: 95,,, | Performed by: INTERNAL MEDICINE

## 2020-07-17 PROCEDURE — 99213 PR OFFICE/OUTPT VISIT, EST, LEVL III, 20-29 MIN: ICD-10-PCS | Mod: 95,,, | Performed by: INTERNAL MEDICINE

## 2020-07-17 NOTE — PROGRESS NOTES
The patient location is:  Patient Home   The chief complaint leading to consultation is:  Follow-up on lab report  Visit type: Virtual visit with synchronous audio and video  Total time spent with patient:  4-5 min    Patient had blood test done recently for further evaluation of testosterone.  Patient has no complaints of dysphagia, chest pain, edema, or rash.  He lost interest in doing a lot of things recently    Physical exam  Alert and oriented  No acute distress  Speech normal    Labs reviewed  Results for KERWIN AGGARWAL (MRN [id number]) as of 7/17/2020 16:00   Ref. Range 5/28/2020 14:55 7/2/2020 11:05   Free T4 Latest Ref Range: 0.71 - 1.51 ng/dL  0.87   PSA, SCREEN Latest Ref Range: 0.00 - 4.00 ng/mL 0.49    Albumin Latest Ref Range: 3.6 - 5.1 g/dL 4.0 4.1   FSH Latest Ref Range: 0.95 - 11.95 mIU/mL  2.10   LH Latest Ref Range: 0.6 - 12.1 mIU/mL  2.8   Prolactin Latest Ref Range: 3.5 - 19.4 ng/mL  7.6   Sex Hormone Binding Globulin Latest Ref Range: 10 - 50 nmol/L 26 22   Testosterone Latest Ref Range: 250 - 1100 ng/dL 271 333   Testosterone, Bioavailable Latest Ref Range: 110.0 - 575.0 ng/dL 78.1 (L) 113.6   Testosterone, Free Latest Ref Range: 46.0 - 224.0 pg/mL 42.5 (L) 60.3     Assessment plan    Low testosterone in the afternoon with normal testosterone in the blood test which was done at about 11:00 a.m.  No need for testosterone treatment.    Hypertension  Patient checked his blood pressure like 1 week ago and he does not remember what was the reading but he believes it was okay.    Follow-up on labs is recommended for 9-12 months.

## 2021-04-06 ENCOUNTER — OFFICE VISIT (OUTPATIENT)
Dept: INTERNAL MEDICINE | Facility: CLINIC | Age: 51
End: 2021-04-06
Payer: COMMERCIAL

## 2021-04-06 ENCOUNTER — LAB VISIT (OUTPATIENT)
Dept: LAB | Facility: HOSPITAL | Age: 51
End: 2021-04-06
Attending: INTERNAL MEDICINE
Payer: COMMERCIAL

## 2021-04-06 VITALS
DIASTOLIC BLOOD PRESSURE: 96 MMHG | OXYGEN SATURATION: 96 % | WEIGHT: 315 LBS | BODY MASS INDEX: 41.75 KG/M2 | SYSTOLIC BLOOD PRESSURE: 150 MMHG | TEMPERATURE: 99 F | HEIGHT: 73 IN | HEART RATE: 71 BPM

## 2021-04-06 DIAGNOSIS — Z00.00 ANNUAL PHYSICAL EXAM: Primary | ICD-10-CM

## 2021-04-06 DIAGNOSIS — I10 ESSENTIAL HYPERTENSION: ICD-10-CM

## 2021-04-06 DIAGNOSIS — E66.01 MORBID OBESITY WITH BMI OF 45.0-49.9, ADULT: ICD-10-CM

## 2021-04-06 DIAGNOSIS — Z00.00 ANNUAL PHYSICAL EXAM: ICD-10-CM

## 2021-04-06 LAB
BASOPHILS # BLD AUTO: 0.04 K/UL (ref 0–0.2)
BASOPHILS NFR BLD: 0.8 % (ref 0–1.9)
DIFFERENTIAL METHOD: ABNORMAL
EOSINOPHIL # BLD AUTO: 0.1 K/UL (ref 0–0.5)
EOSINOPHIL NFR BLD: 1.6 % (ref 0–8)
ERYTHROCYTE [DISTWIDTH] IN BLOOD BY AUTOMATED COUNT: 12.4 % (ref 11.5–14.5)
HCT VFR BLD AUTO: 45.1 % (ref 40–54)
HGB BLD-MCNC: 14.2 G/DL (ref 14–18)
IMM GRANULOCYTES # BLD AUTO: 0.01 K/UL (ref 0–0.04)
IMM GRANULOCYTES NFR BLD AUTO: 0.2 % (ref 0–0.5)
LYMPHOCYTES # BLD AUTO: 2.1 K/UL (ref 1–4.8)
LYMPHOCYTES NFR BLD: 43.9 % (ref 18–48)
MCH RBC QN AUTO: 31.6 PG (ref 27–31)
MCHC RBC AUTO-ENTMCNC: 31.5 G/DL (ref 32–36)
MCV RBC AUTO: 100 FL (ref 82–98)
MONOCYTES # BLD AUTO: 0.5 K/UL (ref 0.3–1)
MONOCYTES NFR BLD: 10.9 % (ref 4–15)
NEUTROPHILS # BLD AUTO: 2.1 K/UL (ref 1.8–7.7)
NEUTROPHILS NFR BLD: 42.6 % (ref 38–73)
NRBC BLD-RTO: 0 /100 WBC
PLATELET # BLD AUTO: 181 K/UL (ref 150–450)
PMV BLD AUTO: 11.7 FL (ref 9.2–12.9)
RBC # BLD AUTO: 4.49 M/UL (ref 4.6–6.2)
WBC # BLD AUTO: 4.85 K/UL (ref 3.9–12.7)

## 2021-04-06 PROCEDURE — 1126F AMNT PAIN NOTED NONE PRSNT: CPT | Mod: S$GLB,,, | Performed by: INTERNAL MEDICINE

## 2021-04-06 PROCEDURE — 84443 ASSAY THYROID STIM HORMONE: CPT | Performed by: INTERNAL MEDICINE

## 2021-04-06 PROCEDURE — 36415 COLL VENOUS BLD VENIPUNCTURE: CPT | Performed by: INTERNAL MEDICINE

## 2021-04-06 PROCEDURE — 3077F PR MOST RECENT SYSTOLIC BLOOD PRESSURE >= 140 MM HG: ICD-10-PCS | Mod: CPTII,S$GLB,, | Performed by: INTERNAL MEDICINE

## 2021-04-06 PROCEDURE — 85025 COMPLETE CBC W/AUTO DIFF WBC: CPT | Performed by: INTERNAL MEDICINE

## 2021-04-06 PROCEDURE — 1126F PR PAIN SEVERITY QUANTIFIED, NO PAIN PRESENT: ICD-10-PCS | Mod: S$GLB,,, | Performed by: INTERNAL MEDICINE

## 2021-04-06 PROCEDURE — 3008F PR BODY MASS INDEX (BMI) DOCUMENTED: ICD-10-PCS | Mod: CPTII,S$GLB,, | Performed by: INTERNAL MEDICINE

## 2021-04-06 PROCEDURE — 99999 PR PBB SHADOW E&M-EST. PATIENT-LVL IV: CPT | Mod: PBBFAC,,, | Performed by: INTERNAL MEDICINE

## 2021-04-06 PROCEDURE — 99999 PR PBB SHADOW E&M-EST. PATIENT-LVL IV: ICD-10-PCS | Mod: PBBFAC,,, | Performed by: INTERNAL MEDICINE

## 2021-04-06 PROCEDURE — 80061 LIPID PANEL: CPT | Performed by: INTERNAL MEDICINE

## 2021-04-06 PROCEDURE — 3080F PR MOST RECENT DIASTOLIC BLOOD PRESSURE >= 90 MM HG: ICD-10-PCS | Mod: CPTII,S$GLB,, | Performed by: INTERNAL MEDICINE

## 2021-04-06 PROCEDURE — 99396 PR PREVENTIVE VISIT,EST,40-64: ICD-10-PCS | Mod: S$GLB,,, | Performed by: INTERNAL MEDICINE

## 2021-04-06 PROCEDURE — 3008F BODY MASS INDEX DOCD: CPT | Mod: CPTII,S$GLB,, | Performed by: INTERNAL MEDICINE

## 2021-04-06 PROCEDURE — 3080F DIAST BP >= 90 MM HG: CPT | Mod: CPTII,S$GLB,, | Performed by: INTERNAL MEDICINE

## 2021-04-06 PROCEDURE — 80053 COMPREHEN METABOLIC PANEL: CPT | Performed by: INTERNAL MEDICINE

## 2021-04-06 PROCEDURE — 3077F SYST BP >= 140 MM HG: CPT | Mod: CPTII,S$GLB,, | Performed by: INTERNAL MEDICINE

## 2021-04-06 PROCEDURE — 99396 PREV VISIT EST AGE 40-64: CPT | Mod: S$GLB,,, | Performed by: INTERNAL MEDICINE

## 2021-04-06 RX ORDER — LOSARTAN POTASSIUM 50 MG/1
50 TABLET ORAL DAILY
Qty: 30 TABLET | Refills: 1 | Status: SHIPPED | OUTPATIENT
Start: 2021-04-06 | End: 2022-08-05

## 2021-04-06 RX ORDER — AMLODIPINE BESYLATE 10 MG/1
10 TABLET ORAL DAILY
Qty: 30 TABLET | Refills: 11 | Status: SHIPPED | OUTPATIENT
Start: 2021-04-06 | End: 2022-04-18

## 2021-04-06 RX ORDER — ATENOLOL 50 MG/1
50 TABLET ORAL DAILY
Qty: 30 TABLET | Refills: 11 | Status: SHIPPED | OUTPATIENT
Start: 2021-04-06 | End: 2022-04-18

## 2021-04-07 ENCOUNTER — PATIENT MESSAGE (OUTPATIENT)
Dept: INTERNAL MEDICINE | Facility: CLINIC | Age: 51
End: 2021-04-07

## 2021-04-07 ENCOUNTER — PATIENT OUTREACH (OUTPATIENT)
Dept: ADMINISTRATIVE | Facility: HOSPITAL | Age: 51
End: 2021-04-07

## 2021-04-07 LAB
ALBUMIN SERPL BCP-MCNC: 3.8 G/DL (ref 3.5–5.2)
ALP SERPL-CCNC: 53 U/L (ref 55–135)
ALT SERPL W/O P-5'-P-CCNC: 31 U/L (ref 10–44)
ANION GAP SERPL CALC-SCNC: 7 MMOL/L (ref 8–16)
AST SERPL-CCNC: 27 U/L (ref 10–40)
BILIRUB SERPL-MCNC: 0.9 MG/DL (ref 0.1–1)
BUN SERPL-MCNC: 11 MG/DL (ref 6–20)
CALCIUM SERPL-MCNC: 9.6 MG/DL (ref 8.7–10.5)
CHLORIDE SERPL-SCNC: 107 MMOL/L (ref 95–110)
CHOLEST SERPL-MCNC: 194 MG/DL (ref 120–199)
CHOLEST/HDLC SERPL: 4.1 {RATIO} (ref 2–5)
CO2 SERPL-SCNC: 26 MMOL/L (ref 23–29)
CREAT SERPL-MCNC: 1.1 MG/DL (ref 0.5–1.4)
EST. GFR  (AFRICAN AMERICAN): >60 ML/MIN/1.73 M^2
EST. GFR  (NON AFRICAN AMERICAN): >60 ML/MIN/1.73 M^2
GLUCOSE SERPL-MCNC: 81 MG/DL (ref 70–110)
HDLC SERPL-MCNC: 47 MG/DL (ref 40–75)
HDLC SERPL: 24.2 % (ref 20–50)
LDLC SERPL CALC-MCNC: 129.4 MG/DL (ref 63–159)
NONHDLC SERPL-MCNC: 147 MG/DL
POTASSIUM SERPL-SCNC: 4.8 MMOL/L (ref 3.5–5.1)
PROT SERPL-MCNC: 7.7 G/DL (ref 6–8.4)
SODIUM SERPL-SCNC: 140 MMOL/L (ref 136–145)
TRIGL SERPL-MCNC: 88 MG/DL (ref 30–150)
TSH SERPL DL<=0.005 MIU/L-ACNC: 0.95 UIU/ML (ref 0.4–4)

## 2021-05-05 ENCOUNTER — PATIENT OUTREACH (OUTPATIENT)
Dept: ADMINISTRATIVE | Facility: HOSPITAL | Age: 51
End: 2021-05-05

## 2021-05-25 ENCOUNTER — LAB VISIT (OUTPATIENT)
Dept: LAB | Facility: HOSPITAL | Age: 51
End: 2021-05-25
Attending: UROLOGY
Payer: COMMERCIAL

## 2021-05-25 DIAGNOSIS — E29.1 HYPOGONADISM IN MALE: ICD-10-CM

## 2021-05-25 DIAGNOSIS — N52.9 ERECTILE DYSFUNCTION, UNSPECIFIED ERECTILE DYSFUNCTION TYPE: ICD-10-CM

## 2021-05-25 DIAGNOSIS — Z12.5 PROSTATE CANCER SCREENING: ICD-10-CM

## 2021-05-25 LAB — COMPLEXED PSA SERPL-MCNC: 0.61 NG/ML (ref 0–4)

## 2021-05-25 PROCEDURE — 84153 ASSAY OF PSA TOTAL: CPT | Performed by: UROLOGY

## 2021-05-25 PROCEDURE — 36415 COLL VENOUS BLD VENIPUNCTURE: CPT | Performed by: UROLOGY

## 2021-06-04 ENCOUNTER — OFFICE VISIT (OUTPATIENT)
Dept: UROLOGY | Facility: CLINIC | Age: 51
End: 2021-06-04
Payer: COMMERCIAL

## 2021-06-04 VITALS
HEIGHT: 73 IN | BODY MASS INDEX: 41.75 KG/M2 | DIASTOLIC BLOOD PRESSURE: 64 MMHG | WEIGHT: 315 LBS | SYSTOLIC BLOOD PRESSURE: 118 MMHG | HEART RATE: 78 BPM

## 2021-06-04 DIAGNOSIS — N52.9 ERECTILE DYSFUNCTION, UNSPECIFIED ERECTILE DYSFUNCTION TYPE: Primary | ICD-10-CM

## 2021-06-04 DIAGNOSIS — Z12.5 PROSTATE CANCER SCREENING: ICD-10-CM

## 2021-06-04 PROCEDURE — 99999 PR PBB SHADOW E&M-EST. PATIENT-LVL III: CPT | Mod: PBBFAC,,, | Performed by: UROLOGY

## 2021-06-04 PROCEDURE — 99213 OFFICE O/P EST LOW 20 MIN: CPT | Mod: S$GLB,,, | Performed by: UROLOGY

## 2021-06-04 PROCEDURE — 1126F PR PAIN SEVERITY QUANTIFIED, NO PAIN PRESENT: ICD-10-PCS | Mod: S$GLB,,, | Performed by: UROLOGY

## 2021-06-04 PROCEDURE — 99999 PR PBB SHADOW E&M-EST. PATIENT-LVL III: ICD-10-PCS | Mod: PBBFAC,,, | Performed by: UROLOGY

## 2021-06-04 PROCEDURE — 3008F PR BODY MASS INDEX (BMI) DOCUMENTED: ICD-10-PCS | Mod: CPTII,S$GLB,, | Performed by: UROLOGY

## 2021-06-04 PROCEDURE — 3008F BODY MASS INDEX DOCD: CPT | Mod: CPTII,S$GLB,, | Performed by: UROLOGY

## 2021-06-04 PROCEDURE — 1126F AMNT PAIN NOTED NONE PRSNT: CPT | Mod: S$GLB,,, | Performed by: UROLOGY

## 2021-06-04 PROCEDURE — 99213 PR OFFICE/OUTPT VISIT, EST, LEVL III, 20-29 MIN: ICD-10-PCS | Mod: S$GLB,,, | Performed by: UROLOGY

## 2021-06-04 RX ORDER — TADALAFIL 10 MG/1
10 TABLET ORAL DAILY PRN
Qty: 30 TABLET | Refills: 11 | Status: SHIPPED | OUTPATIENT
Start: 2021-06-04 | End: 2022-06-17 | Stop reason: SDUPTHER

## 2021-09-16 ENCOUNTER — PATIENT OUTREACH (OUTPATIENT)
Dept: ADMINISTRATIVE | Facility: HOSPITAL | Age: 51
End: 2021-09-16

## 2021-12-09 ENCOUNTER — PATIENT MESSAGE (OUTPATIENT)
Dept: INTERNAL MEDICINE | Facility: CLINIC | Age: 51
End: 2021-12-09
Payer: COMMERCIAL

## 2021-12-10 ENCOUNTER — OFFICE VISIT (OUTPATIENT)
Dept: INTERNAL MEDICINE | Facility: CLINIC | Age: 51
End: 2021-12-10
Payer: COMMERCIAL

## 2021-12-10 DIAGNOSIS — Z56.6 STRESS AT WORK: Primary | ICD-10-CM

## 2021-12-10 PROCEDURE — 99213 OFFICE O/P EST LOW 20 MIN: CPT | Mod: 95,ICN,, | Performed by: INTERNAL MEDICINE

## 2021-12-10 PROCEDURE — 4010F PR ACE/ARB THEARPY RXD/TAKEN: ICD-10-PCS | Mod: CPTII,95,, | Performed by: INTERNAL MEDICINE

## 2021-12-10 PROCEDURE — 4010F ACE/ARB THERAPY RXD/TAKEN: CPT | Mod: CPTII,95,, | Performed by: INTERNAL MEDICINE

## 2021-12-10 PROCEDURE — 99213 PR OFFICE/OUTPT VISIT, EST, LEVL III, 20-29 MIN: ICD-10-PCS | Mod: 95,ICN,, | Performed by: INTERNAL MEDICINE

## 2021-12-10 SDOH — SOCIAL DETERMINANTS OF HEALTH (SDOH): OTHER PHYSICAL AND MENTAL STRAIN RELATED TO WORK: Z56.6

## 2022-02-08 ENCOUNTER — PATIENT OUTREACH (OUTPATIENT)
Dept: ADMINISTRATIVE | Facility: HOSPITAL | Age: 52
End: 2022-02-08
Payer: COMMERCIAL

## 2022-02-08 ENCOUNTER — TELEPHONE (OUTPATIENT)
Dept: ADMINISTRATIVE | Facility: HOSPITAL | Age: 52
End: 2022-02-08
Payer: COMMERCIAL

## 2022-02-08 NOTE — PROGRESS NOTES
Working HTN Report.    Requested updated bp reading. States he does not have a machine, but would be in [place] tomorrow and would come by clinic to get it checked. He declined to schedule as he was not sure when he could come in.  Mess sent to staff requesting help to schedule when he comes in.

## 2022-02-08 NOTE — TELEPHONE ENCOUNTER
Working HTN Report.    Pt needs bp check. States he will be in town tomorrow, 2/09/22 and would come by for a nurse bp visit, to get it checked. He was unable to say when he could come in. States he will walk in for appt.   Can someone please help him get a nurse bp check when he comes in please.

## 2022-03-18 ENCOUNTER — PATIENT MESSAGE (OUTPATIENT)
Dept: ADMINISTRATIVE | Facility: HOSPITAL | Age: 52
End: 2022-03-18
Payer: COMMERCIAL

## 2022-04-12 ENCOUNTER — PATIENT MESSAGE (OUTPATIENT)
Dept: ADMINISTRATIVE | Facility: HOSPITAL | Age: 52
End: 2022-04-12
Payer: COMMERCIAL

## 2022-04-13 DIAGNOSIS — Z12.11 SCREENING FOR COLON CANCER: ICD-10-CM

## 2022-04-13 DIAGNOSIS — I10 HYPERTENSION GOAL BP (BLOOD PRESSURE) < 140/90: ICD-10-CM

## 2022-05-20 ENCOUNTER — PATIENT OUTREACH (OUTPATIENT)
Dept: ADMINISTRATIVE | Facility: HOSPITAL | Age: 52
End: 2022-05-20
Payer: COMMERCIAL

## 2022-06-06 ENCOUNTER — LAB VISIT (OUTPATIENT)
Dept: LAB | Facility: HOSPITAL | Age: 52
End: 2022-06-06
Attending: UROLOGY
Payer: COMMERCIAL

## 2022-06-06 DIAGNOSIS — Z12.5 PROSTATE CANCER SCREENING: ICD-10-CM

## 2022-06-06 LAB — COMPLEXED PSA SERPL-MCNC: 0.6 NG/ML (ref 0–4)

## 2022-06-06 PROCEDURE — 84153 ASSAY OF PSA TOTAL: CPT | Performed by: UROLOGY

## 2022-06-06 PROCEDURE — 36415 COLL VENOUS BLD VENIPUNCTURE: CPT | Performed by: UROLOGY

## 2022-06-17 ENCOUNTER — OFFICE VISIT (OUTPATIENT)
Dept: UROLOGY | Facility: CLINIC | Age: 52
End: 2022-06-17
Payer: COMMERCIAL

## 2022-06-17 VITALS
DIASTOLIC BLOOD PRESSURE: 88 MMHG | SYSTOLIC BLOOD PRESSURE: 135 MMHG | WEIGHT: 315 LBS | HEART RATE: 60 BPM | BODY MASS INDEX: 46.39 KG/M2

## 2022-06-17 DIAGNOSIS — N52.9 ERECTILE DYSFUNCTION, UNSPECIFIED ERECTILE DYSFUNCTION TYPE: Primary | ICD-10-CM

## 2022-06-17 DIAGNOSIS — N52.9 ERECTILE DYSFUNCTION, UNSPECIFIED ERECTILE DYSFUNCTION TYPE: ICD-10-CM

## 2022-06-17 DIAGNOSIS — Z12.5 PROSTATE CANCER SCREENING: ICD-10-CM

## 2022-06-17 PROCEDURE — 1159F MED LIST DOCD IN RCRD: CPT | Mod: CPTII,S$GLB,, | Performed by: UROLOGY

## 2022-06-17 PROCEDURE — 3008F BODY MASS INDEX DOCD: CPT | Mod: CPTII,S$GLB,, | Performed by: UROLOGY

## 2022-06-17 PROCEDURE — 3079F PR MOST RECENT DIASTOLIC BLOOD PRESSURE 80-89 MM HG: ICD-10-PCS | Mod: CPTII,S$GLB,, | Performed by: UROLOGY

## 2022-06-17 PROCEDURE — 99999 PR PBB SHADOW E&M-EST. PATIENT-LVL III: ICD-10-PCS | Mod: PBBFAC,,, | Performed by: UROLOGY

## 2022-06-17 PROCEDURE — 99213 PR OFFICE/OUTPT VISIT, EST, LEVL III, 20-29 MIN: ICD-10-PCS | Mod: S$GLB,,, | Performed by: UROLOGY

## 2022-06-17 PROCEDURE — 1160F PR REVIEW ALL MEDS BY PRESCRIBER/CLIN PHARMACIST DOCUMENTED: ICD-10-PCS | Mod: CPTII,S$GLB,, | Performed by: UROLOGY

## 2022-06-17 PROCEDURE — 3079F DIAST BP 80-89 MM HG: CPT | Mod: CPTII,S$GLB,, | Performed by: UROLOGY

## 2022-06-17 PROCEDURE — 3008F PR BODY MASS INDEX (BMI) DOCUMENTED: ICD-10-PCS | Mod: CPTII,S$GLB,, | Performed by: UROLOGY

## 2022-06-17 PROCEDURE — 1160F RVW MEDS BY RX/DR IN RCRD: CPT | Mod: CPTII,S$GLB,, | Performed by: UROLOGY

## 2022-06-17 PROCEDURE — 1159F PR MEDICATION LIST DOCUMENTED IN MEDICAL RECORD: ICD-10-PCS | Mod: CPTII,S$GLB,, | Performed by: UROLOGY

## 2022-06-17 PROCEDURE — 3075F SYST BP GE 130 - 139MM HG: CPT | Mod: CPTII,S$GLB,, | Performed by: UROLOGY

## 2022-06-17 PROCEDURE — 99999 PR PBB SHADOW E&M-EST. PATIENT-LVL III: CPT | Mod: PBBFAC,,, | Performed by: UROLOGY

## 2022-06-17 PROCEDURE — 3075F PR MOST RECENT SYSTOLIC BLOOD PRESS GE 130-139MM HG: ICD-10-PCS | Mod: CPTII,S$GLB,, | Performed by: UROLOGY

## 2022-06-17 PROCEDURE — 99213 OFFICE O/P EST LOW 20 MIN: CPT | Mod: S$GLB,,, | Performed by: UROLOGY

## 2022-06-17 RX ORDER — TADALAFIL 10 MG/1
10 TABLET ORAL DAILY PRN
Qty: 30 TABLET | Refills: 11 | Status: SHIPPED | OUTPATIENT
Start: 2022-06-17 | End: 2023-06-23 | Stop reason: SDUPTHER

## 2022-06-17 RX ORDER — TADALAFIL 10 MG/1
10 TABLET ORAL DAILY PRN
Qty: 30 TABLET | Refills: 11 | Status: SHIPPED | OUTPATIENT
Start: 2022-06-17 | End: 2022-06-17 | Stop reason: SDUPTHER

## 2022-06-17 NOTE — PROGRESS NOTES
Chief Complaint:   Encounter Diagnoses   Name Primary?    Erectile dysfunction, unspecified erectile dysfunction type Yes    Prostate cancer screening          HPI:   6/17/22- Cialis is currently working well for the patient, no voiding complaints, PSA is stable.  5/28/20: 48 yo man referred for low T workup.  Uses CPAP.  No abd/pelvic pain and no exac/rel factors.  No hematuria.  No urolithiasis.  No urinary bother.  No  history.  Normal sexual function.  Dropped 70 lbs a few years ago and put it back on.  No family CaP.  T was 400 with a normal free T value calculated.  Feels low desire to do anything, ED is the main .    Allergies:  No known drug allergies    Medications:  has a current medication list which includes the following prescription(s): amlodipine, atenolol, multivitamin, tadalafil, and losartan.    Review of Systems:  General: No fever, chills, fatigability, or weight loss.  Skin: No rashes, itching, or changes in color or texture of skin.  Chest: Denies LONG, cyanosis, wheezing, cough, and sputum production.  Abdomen: Appetite fine. No weight loss. Denies diarrhea, abdominal pain, hematemesis, or0 blood in stool.  Musculoskeletal: No joint stiffness or swelling. Denies back pain.  : As above.  All other review of systems negative.    PMH:   has a past medical history of Hypertension, Morbid obesity, CHARLINE (obstructive sleep apnea), and Testosterone deficiency.    PSH:   has no past surgical history on file.    FamHx: family history includes Diabetes in his mother; Hypertension in his father and mother.    SocHx:  reports that he has never smoked. He has never used smokeless tobacco. He reports that he does not drink alcohol. No history on file for drug use.      Physical Exam:  Vitals:    06/17/22 1022   BP: 135/88   Pulse: 60     General: A&Ox3, no apparent distress, no deformities  Neck: No masses, normal thyroid  Lungs: normal inspiration, no use of accessory muscles  Heart: normal pulse,  no arrhythmias  Abdomen: Soft, NT, ND, no masses, no hernias, no hepatosplenomegaly  Lymphatic: Neck and groin nodes negative  Skin: The skin is warm and dry. No jaundice.  Ext: No c/c/e.    Labs/Studies:   PSA 0.60 6/22    Impression/Plan:      1. Erectile dysfunction- Cialis 10 mg works extremely well for the patient, refill has been sent in for him.  Call with any complaints in the meantime.  Otherwise see me in one year with a psa.    2. PSA screening- PSA stable continue yearly.  No evidence of voiding complaints.

## 2022-07-29 ENCOUNTER — LAB VISIT (OUTPATIENT)
Dept: LAB | Facility: HOSPITAL | Age: 52
End: 2022-07-29
Attending: INTERNAL MEDICINE
Payer: COMMERCIAL

## 2022-07-29 DIAGNOSIS — I10 HYPERTENSION GOAL BP (BLOOD PRESSURE) < 140/90: ICD-10-CM

## 2022-07-29 LAB
ALBUMIN SERPL BCP-MCNC: 4 G/DL (ref 3.5–5.2)
ALP SERPL-CCNC: 46 U/L (ref 55–135)
ALT SERPL W/O P-5'-P-CCNC: 37 U/L (ref 10–44)
ANION GAP SERPL CALC-SCNC: 9 MMOL/L (ref 8–16)
AST SERPL-CCNC: 32 U/L (ref 10–40)
BILIRUB SERPL-MCNC: 1.1 MG/DL (ref 0.1–1)
BUN SERPL-MCNC: 16 MG/DL (ref 6–20)
CALCIUM SERPL-MCNC: 9.6 MG/DL (ref 8.7–10.5)
CHLORIDE SERPL-SCNC: 107 MMOL/L (ref 95–110)
CO2 SERPL-SCNC: 23 MMOL/L (ref 23–29)
CREAT SERPL-MCNC: 1.1 MG/DL (ref 0.5–1.4)
EST. GFR  (AFRICAN AMERICAN): >60 ML/MIN/1.73 M^2
EST. GFR  (NON AFRICAN AMERICAN): >60 ML/MIN/1.73 M^2
GLUCOSE SERPL-MCNC: 87 MG/DL (ref 70–110)
POTASSIUM SERPL-SCNC: 4.9 MMOL/L (ref 3.5–5.1)
PROT SERPL-MCNC: 7.5 G/DL (ref 6–8.4)
SODIUM SERPL-SCNC: 139 MMOL/L (ref 136–145)

## 2022-07-29 PROCEDURE — 36415 COLL VENOUS BLD VENIPUNCTURE: CPT | Performed by: INTERNAL MEDICINE

## 2022-07-29 PROCEDURE — 80053 COMPREHEN METABOLIC PANEL: CPT | Performed by: INTERNAL MEDICINE

## 2022-08-05 ENCOUNTER — OFFICE VISIT (OUTPATIENT)
Dept: INTERNAL MEDICINE | Facility: CLINIC | Age: 52
End: 2022-08-05
Payer: COMMERCIAL

## 2022-08-05 VITALS
DIASTOLIC BLOOD PRESSURE: 84 MMHG | OXYGEN SATURATION: 98 % | WEIGHT: 315 LBS | TEMPERATURE: 98 F | BODY MASS INDEX: 41.75 KG/M2 | HEIGHT: 73 IN | SYSTOLIC BLOOD PRESSURE: 136 MMHG | HEART RATE: 71 BPM

## 2022-08-05 DIAGNOSIS — Z00.00 ANNUAL PHYSICAL EXAM: Primary | ICD-10-CM

## 2022-08-05 DIAGNOSIS — E66.01 MORBID OBESITY WITH BMI OF 40.0-44.9, ADULT: ICD-10-CM

## 2022-08-05 DIAGNOSIS — I10 ESSENTIAL HYPERTENSION: ICD-10-CM

## 2022-08-05 PROBLEM — Z12.5 PROSTATE CANCER SCREENING: Status: RESOLVED | Noted: 2022-06-17 | Resolved: 2022-08-05

## 2022-08-05 PROCEDURE — 99396 PREV VISIT EST AGE 40-64: CPT | Mod: S$GLB,,, | Performed by: INTERNAL MEDICINE

## 2022-08-05 PROCEDURE — 1159F PR MEDICATION LIST DOCUMENTED IN MEDICAL RECORD: ICD-10-PCS | Mod: CPTII,S$GLB,, | Performed by: INTERNAL MEDICINE

## 2022-08-05 PROCEDURE — 1159F MED LIST DOCD IN RCRD: CPT | Mod: CPTII,S$GLB,, | Performed by: INTERNAL MEDICINE

## 2022-08-05 PROCEDURE — 99999 PR PBB SHADOW E&M-EST. PATIENT-LVL IV: ICD-10-PCS | Mod: PBBFAC,,, | Performed by: INTERNAL MEDICINE

## 2022-08-05 PROCEDURE — 3079F DIAST BP 80-89 MM HG: CPT | Mod: CPTII,S$GLB,, | Performed by: INTERNAL MEDICINE

## 2022-08-05 PROCEDURE — 3079F PR MOST RECENT DIASTOLIC BLOOD PRESSURE 80-89 MM HG: ICD-10-PCS | Mod: CPTII,S$GLB,, | Performed by: INTERNAL MEDICINE

## 2022-08-05 PROCEDURE — 1160F PR REVIEW ALL MEDS BY PRESCRIBER/CLIN PHARMACIST DOCUMENTED: ICD-10-PCS | Mod: CPTII,S$GLB,, | Performed by: INTERNAL MEDICINE

## 2022-08-05 PROCEDURE — 99999 PR PBB SHADOW E&M-EST. PATIENT-LVL IV: CPT | Mod: PBBFAC,,, | Performed by: INTERNAL MEDICINE

## 2022-08-05 PROCEDURE — 3075F PR MOST RECENT SYSTOLIC BLOOD PRESS GE 130-139MM HG: ICD-10-PCS | Mod: CPTII,S$GLB,, | Performed by: INTERNAL MEDICINE

## 2022-08-05 PROCEDURE — 3008F BODY MASS INDEX DOCD: CPT | Mod: CPTII,S$GLB,, | Performed by: INTERNAL MEDICINE

## 2022-08-05 PROCEDURE — 99396 PR PREVENTIVE VISIT,EST,40-64: ICD-10-PCS | Mod: S$GLB,,, | Performed by: INTERNAL MEDICINE

## 2022-08-05 PROCEDURE — 3075F SYST BP GE 130 - 139MM HG: CPT | Mod: CPTII,S$GLB,, | Performed by: INTERNAL MEDICINE

## 2022-08-05 PROCEDURE — 1160F RVW MEDS BY RX/DR IN RCRD: CPT | Mod: CPTII,S$GLB,, | Performed by: INTERNAL MEDICINE

## 2022-08-05 PROCEDURE — 3008F PR BODY MASS INDEX (BMI) DOCUMENTED: ICD-10-PCS | Mod: CPTII,S$GLB,, | Performed by: INTERNAL MEDICINE

## 2022-08-05 NOTE — PROGRESS NOTES
Odilon Tejeda Olancha  51 y.o. Black or  male  [id number]    Chief Complaint:  Chief Complaint   Patient presents with    Annual Exam       History of Present Illness:  Presents to the clinic for an annual exam. He is without complaints.   HTN--controlled on amlodipine and atenolol. He has a b/p machine at home but does not check his b/p.     Laboratory   Lab Results   Component Value Date    WBC 4.85 04/06/2021    HGB 14.2 04/06/2021    HCT 45.1 04/06/2021     04/06/2021    CHOL 194 04/06/2021    TRIG 88 04/06/2021    HDL 47 04/06/2021    ALT 37 07/29/2022    AST 32 07/29/2022     07/29/2022    K 4.9 07/29/2022     07/29/2022    CREATININE 1.1 07/29/2022    BUN 16 07/29/2022    CO2 23 07/29/2022    TSH 0.953 04/06/2021    PSA 0.60 06/06/2022    HGBA1C 4.7 07/11/2012     Review of Systems   Constitutional: Negative for fever and weight loss.   HENT: Negative for hearing loss.    Eyes: Negative for blurred vision.   Respiratory: Negative for cough and shortness of breath.    Cardiovascular: Negative for chest pain and leg swelling.   Gastrointestinal: Negative for abdominal pain, constipation and diarrhea.   Genitourinary: Negative for dysuria.   Neurological: Negative for dizziness and headaches.   Psychiatric/Behavioral: The patient does not have insomnia.        The following were reviewed: Active problem list, medication list, allergies, family history, social history, and Health Maintenance.     History:  Past Medical History:   Diagnosis Date    Hypertension     Morbid obesity     CHARLINE (obstructive sleep apnea)     Testosterone deficiency      History reviewed. No pertinent surgical history.  Family History   Problem Relation Age of Onset    Hypertension Mother     Diabetes Mother     Hypertension Father      Social History     Socioeconomic History    Marital status:    Occupational History     Employer: [employer]   Tobacco Use    Smoking status: Never Smoker     Smokeless tobacco: Never Used   Substance and Sexual Activity    Alcohol use: No     Alcohol/week: 0.0 standard drinks     Social Determinants of Health     Financial Resource Strain: Low Risk     Difficulty of Paying Living Expenses: Not very hard   Food Insecurity: No Food Insecurity    Worried About Running Out of Food in the Last Year: Never true    Ran Out of Food in the Last Year: Never true   Transportation Needs: No Transportation Needs    Lack of Transportation (Medical): No    Lack of Transportation (Non-Medical): No   Physical Activity: Insufficiently Active    Days of Exercise per Week: 1 day    Minutes of Exercise per Session: 10 min   Stress: No Stress Concern Present    Feeling of Stress : Only a little   Social Connections: Unknown    Frequency of Communication with Friends and Family: More than three times a week    Frequency of Social Gatherings with Friends and Family: Once a week    Active Member of Clubs or Organizations: No    Attends Club or Organization Meetings: Never    Marital Status:    Housing Stability: Low Risk     Unable to Pay for Housing in the Last Year: No    Number of Places Lived in the Last Year: 1    Unstable Housing in the Last Year: No     Patient Active Problem List   Diagnosis    Essential hypertension    BMI 40.0-44.9, adult    Hypogonadism male    CHARLINE on CPAP    Erectile dysfunction     Review of patient's allergies indicates:   Allergen Reactions    No known drug allergies        Health Maintenance  Health Maintenance Topics with due status: Not Due       Topic Last Completion Date    Lipid Panel 04/06/2021    Influenza Vaccine Not Due     Health Maintenance Due   Topic Date Due    TETANUS VACCINE  Never done    Colorectal Cancer Screening  Never done    Shingles Vaccine (1 of 2) Never done    COVID-19 Vaccine (4 - Booster for Moderna series) 03/13/2022       Medications:  Current Outpatient Medications on File Prior to Visit    Medication Sig Dispense Refill    amLODIPine (NORVASC) 10 MG tablet Take 1 tablet (10 mg total) by mouth once daily. 90 tablet 0    atenoloL (TENORMIN) 50 MG tablet Take 1 tablet (50 mg total) by mouth once daily. 90 tablet 0    multivitamin (THERAGRAN) per tablet Take 1 tablet by mouth once daily.      tadalafiL (CIALIS) 10 MG tablet Take 1 tablet (10 mg total) by mouth daily as needed for Erectile Dysfunction. 30 tablet 11       Medications have been reviewed and reconciled with patient at visit today.    Exam:  Vitals:    08/05/22 0742   BP: 136/84   Pulse: 71   Temp: 97.9 °F (36.6 °C)     Weight: (!) 152.9 kg (337 lb 1.3 oz)   Body mass index is 44.47 kg/m².      Physical Exam  Vitals reviewed.   Constitutional:       General: He is not in acute distress.     Appearance: He is well-developed. He is obese. He is not ill-appearing.   Eyes:      General: No scleral icterus.  Cardiovascular:      Rate and Rhythm: Normal rate and regular rhythm.      Heart sounds: Normal heart sounds.   Pulmonary:      Effort: Pulmonary effort is normal. No respiratory distress.      Breath sounds: Normal breath sounds. No wheezing or rales.   Abdominal:      General: Bowel sounds are normal.      Palpations: Abdomen is soft.      Tenderness: There is no abdominal tenderness.   Musculoskeletal:      Right lower leg: No edema.      Left lower leg: No edema.   Skin:     General: Skin is warm and dry.   Neurological:      Mental Status: He is alert and oriented to person, place, and time.   Psychiatric:         Behavior: Behavior normal.       Assessment:  The primary encounter diagnosis was Annual physical exam. Diagnoses of Essential hypertension and Morbid obesity with BMI of 40.0-44.9, adult were also pertinent to this visit.    Plan:  Annual physical exam  -     Counseled regarding age appropriate screenings and immunizations  -     Counseled regarding lifestyle modifications  -     CBC Auto Differential; Future; Expected  date: 08/05/2022  -     Comprehensive Metabolic Panel; Future; Expected date: 08/05/2022  -     TSH; Future  -     Lipid panel; Future; Expected date: 08/05/2022    Essential hypertension  -     Continue amlodipine and atenolol  -     Recommend home b/p monitoring     Morbid obesity with BMI of 40.0-44.9, adult  -     Lifestyle modifications discussed    Follow up in about 1 year (around 8/5/2023).

## 2022-10-03 ENCOUNTER — PATIENT MESSAGE (OUTPATIENT)
Dept: ADMINISTRATIVE | Facility: HOSPITAL | Age: 52
End: 2022-10-03
Payer: COMMERCIAL

## 2022-10-25 DIAGNOSIS — I10 ESSENTIAL HYPERTENSION: ICD-10-CM

## 2022-10-25 RX ORDER — ATENOLOL 50 MG/1
50 TABLET ORAL DAILY
Qty: 90 TABLET | Refills: 0 | Status: SHIPPED | OUTPATIENT
Start: 2022-10-25 | End: 2023-02-05 | Stop reason: SDUPTHER

## 2022-10-25 RX ORDER — AMLODIPINE BESYLATE 10 MG/1
10 TABLET ORAL DAILY
Qty: 90 TABLET | Refills: 0 | Status: SHIPPED | OUTPATIENT
Start: 2022-10-25 | End: 2023-02-05 | Stop reason: SDUPTHER

## 2022-10-31 ENCOUNTER — OFFICE VISIT (OUTPATIENT)
Dept: INTERNAL MEDICINE | Facility: CLINIC | Age: 52
End: 2022-10-31
Payer: COMMERCIAL

## 2022-10-31 DIAGNOSIS — F43.23 SITUATIONAL MIXED ANXIETY AND DEPRESSIVE DISORDER: ICD-10-CM

## 2022-10-31 DIAGNOSIS — F43.9 STRESS: Primary | ICD-10-CM

## 2022-10-31 PROCEDURE — 1160F RVW MEDS BY RX/DR IN RCRD: CPT | Mod: CPTII,95,, | Performed by: INTERNAL MEDICINE

## 2022-10-31 PROCEDURE — 1159F MED LIST DOCD IN RCRD: CPT | Mod: CPTII,95,, | Performed by: INTERNAL MEDICINE

## 2022-10-31 PROCEDURE — 99213 OFFICE O/P EST LOW 20 MIN: CPT | Mod: 95,,, | Performed by: INTERNAL MEDICINE

## 2022-10-31 PROCEDURE — 99213 PR OFFICE/OUTPT VISIT, EST, LEVL III, 20-29 MIN: ICD-10-PCS | Mod: 95,,, | Performed by: INTERNAL MEDICINE

## 2022-10-31 PROCEDURE — 1160F PR REVIEW ALL MEDS BY PRESCRIBER/CLIN PHARMACIST DOCUMENTED: ICD-10-PCS | Mod: CPTII,95,, | Performed by: INTERNAL MEDICINE

## 2022-10-31 PROCEDURE — 1159F PR MEDICATION LIST DOCUMENTED IN MEDICAL RECORD: ICD-10-PCS | Mod: CPTII,95,, | Performed by: INTERNAL MEDICINE

## 2022-10-31 NOTE — LETTER
October 31, 2022    Odilon Del Rio  [address] [facility] [address]             [facility]  Internal Medicine  [address] [place] [id number]  Phone: [phone]  Fax: [phone]   October 31, 2022     Patient: Odilon Del Rio   YOB: 1970   Date of Visit: 10/31/2022       To Whom it May Concern:    Odilon Del Rio was seen in my clinic on 10/31/2022.     Please excuse from work starting on 11/2/2022 and return on 11/7/2022.     If you have any questions or concerns, please don't hesitate to call.    Sincerely,         Lisa Osborn, DO

## 2022-10-31 NOTE — PROGRESS NOTES
Subjective:       Patient ID: Odilon Del Rio is a 52 y.o. male.    Chief Complaint: Stress    The patient location is: [place]   The chief complaint leading to consultation is: stress/need time off from work    Visit type: audiovisual    Face to Face time with patient: 8 minutes   8 minutes of total time spent on the encounter, which includes face to face time and non-face to face time preparing to see the patient (eg, review of tests), Obtaining and/or reviewing separately obtained history, Documenting clinical information in the electronic or other health record, Independently interpreting results (not separately reported) and communicating results to the patient/family/caregiver, or Care coordination (not separately reported).       Each patient to whom he or she provides medical services by telemedicine is:  (1) informed of the relationship between the physician and patient and the respective role of any other health care provider with respect to management of the patient; and (2) notified that he or she may decline to receive medical services by telemedicine and may withdraw from such care at any time.    Notes:   Under a lot of stress. Has been grieving the death of his mother since her passing in April. Started counseling in June. Feels he needs a few days to get back on track. He feels overwhelmed, anxious and depressed. Having impaired sleep as well.       Review of Systems   Constitutional:  Positive for activity change. Negative for unexpected weight change.   HENT:  Negative for hearing loss, rhinorrhea and trouble swallowing.    Eyes:  Negative for discharge and visual disturbance.   Respiratory:  Negative for chest tightness and wheezing.    Cardiovascular:  Negative for chest pain and palpitations.   Gastrointestinal:  Negative for blood in stool, constipation, diarrhea and vomiting.   Endocrine: Negative for polydipsia and polyuria.   Genitourinary:  Negative for difficulty urinating,  hematuria and urgency.   Musculoskeletal:  Negative for arthralgias, joint swelling and neck pain.   Neurological:  Negative for weakness and headaches.   Psychiatric/Behavioral:  Positive for dysphoric mood and sleep disturbance. Negative for confusion.        Objective:      Physical Exam  Constitutional:       General: He is not in acute distress.     Appearance: He is well-developed. He is not ill-appearing.   Pulmonary:      Effort: Pulmonary effort is normal. No respiratory distress.   Neurological:      Mental Status: He is alert and oriented to person, place, and time.   Psychiatric:         Behavior: Behavior normal.         Thought Content: Thought content normal.         Judgment: Judgment normal.       Assessment:       Problem List Items Addressed This Visit    None  Visit Diagnoses       Stress    -  Primary    Situational mixed anxiety and depressive disorder                Plan:       Odilon was seen today for stress.    Diagnoses and all orders for this visit:    Stress    Situational mixed anxiety and depressive disorder    Agree with time off from work.     Continue counseling.

## 2022-10-31 NOTE — Clinical Note
Patient will come to  work excuse this week.  Plan:  1. Given some weight regain over the winter, continue working with PT on your shoulder and finding a comfortable position in bed as best you can.  2. Your sleep schedule will be vital for success. Trying to get to sleep by 10:30pm to 11pm and no further than about 5 hours after supper.   3. Follow up with dietician to help with lower inflammatory diet mindfulness, meeting with Tamie to switch things up a little.        .kt

## 2023-01-10 ENCOUNTER — PATIENT MESSAGE (OUTPATIENT)
Dept: ADMINISTRATIVE | Facility: CLINIC | Age: 53
End: 2023-01-10
Payer: COMMERCIAL

## 2023-01-10 ENCOUNTER — TELEPHONE (OUTPATIENT)
Dept: INTERNAL MEDICINE | Facility: CLINIC | Age: 53
End: 2023-01-10
Payer: COMMERCIAL

## 2023-01-10 ENCOUNTER — OFFICE VISIT (OUTPATIENT)
Dept: INTERNAL MEDICINE | Facility: CLINIC | Age: 53
End: 2023-01-10
Payer: COMMERCIAL

## 2023-01-10 VITALS
HEIGHT: 73 IN | HEART RATE: 91 BPM | DIASTOLIC BLOOD PRESSURE: 78 MMHG | SYSTOLIC BLOOD PRESSURE: 168 MMHG | BODY MASS INDEX: 41.75 KG/M2 | OXYGEN SATURATION: 95 % | RESPIRATION RATE: 18 BRPM | TEMPERATURE: 100 F | WEIGHT: 315 LBS

## 2023-01-10 DIAGNOSIS — R05.9 COUGH, UNSPECIFIED TYPE: ICD-10-CM

## 2023-01-10 DIAGNOSIS — U07.1 COVID-19 VIRUS INFECTION: Primary | ICD-10-CM

## 2023-01-10 LAB
CTP QC/QA: YES
CTP QC/QA: YES
POC MOLECULAR INFLUENZA A AGN: NEGATIVE
POC MOLECULAR INFLUENZA B AGN: NEGATIVE
SARS-COV-2 RDRP RESP QL NAA+PROBE: POSITIVE

## 2023-01-10 PROCEDURE — 87502 POCT INFLUENZA A/B MOLECULAR: ICD-10-PCS | Mod: QW,S$GLB,, | Performed by: PHYSICIAN ASSISTANT

## 2023-01-10 PROCEDURE — 87635: ICD-10-PCS | Mod: QW,S$GLB,, | Performed by: PHYSICIAN ASSISTANT

## 2023-01-10 PROCEDURE — 99213 OFFICE O/P EST LOW 20 MIN: CPT | Mod: S$GLB,,, | Performed by: PHYSICIAN ASSISTANT

## 2023-01-10 PROCEDURE — 3078F PR MOST RECENT DIASTOLIC BLOOD PRESSURE < 80 MM HG: ICD-10-PCS | Mod: CPTII,S$GLB,, | Performed by: PHYSICIAN ASSISTANT

## 2023-01-10 PROCEDURE — 1159F MED LIST DOCD IN RCRD: CPT | Mod: CPTII,S$GLB,, | Performed by: PHYSICIAN ASSISTANT

## 2023-01-10 PROCEDURE — 87635 SARS-COV-2 COVID-19 AMP PRB: CPT | Mod: QW,S$GLB,, | Performed by: PHYSICIAN ASSISTANT

## 2023-01-10 PROCEDURE — 3077F PR MOST RECENT SYSTOLIC BLOOD PRESSURE >= 140 MM HG: ICD-10-PCS | Mod: CPTII,S$GLB,, | Performed by: PHYSICIAN ASSISTANT

## 2023-01-10 PROCEDURE — 3078F DIAST BP <80 MM HG: CPT | Mod: CPTII,S$GLB,, | Performed by: PHYSICIAN ASSISTANT

## 2023-01-10 PROCEDURE — 1160F RVW MEDS BY RX/DR IN RCRD: CPT | Mod: CPTII,S$GLB,, | Performed by: PHYSICIAN ASSISTANT

## 2023-01-10 PROCEDURE — 3008F PR BODY MASS INDEX (BMI) DOCUMENTED: ICD-10-PCS | Mod: CPTII,S$GLB,, | Performed by: PHYSICIAN ASSISTANT

## 2023-01-10 PROCEDURE — 99999 PR PBB SHADOW E&M-EST. PATIENT-LVL IV: ICD-10-PCS | Mod: PBBFAC,,, | Performed by: PHYSICIAN ASSISTANT

## 2023-01-10 PROCEDURE — 87502 INFLUENZA DNA AMP PROBE: CPT | Mod: QW,S$GLB,, | Performed by: PHYSICIAN ASSISTANT

## 2023-01-10 PROCEDURE — 1160F PR REVIEW ALL MEDS BY PRESCRIBER/CLIN PHARMACIST DOCUMENTED: ICD-10-PCS | Mod: CPTII,S$GLB,, | Performed by: PHYSICIAN ASSISTANT

## 2023-01-10 PROCEDURE — 3008F BODY MASS INDEX DOCD: CPT | Mod: CPTII,S$GLB,, | Performed by: PHYSICIAN ASSISTANT

## 2023-01-10 PROCEDURE — 1159F PR MEDICATION LIST DOCUMENTED IN MEDICAL RECORD: ICD-10-PCS | Mod: CPTII,S$GLB,, | Performed by: PHYSICIAN ASSISTANT

## 2023-01-10 PROCEDURE — 3077F SYST BP >= 140 MM HG: CPT | Mod: CPTII,S$GLB,, | Performed by: PHYSICIAN ASSISTANT

## 2023-01-10 PROCEDURE — 99999 PR PBB SHADOW E&M-EST. PATIENT-LVL IV: CPT | Mod: PBBFAC,,, | Performed by: PHYSICIAN ASSISTANT

## 2023-01-10 PROCEDURE — 99213 PR OFFICE/OUTPT VISIT, EST, LEVL III, 20-29 MIN: ICD-10-PCS | Mod: S$GLB,,, | Performed by: PHYSICIAN ASSISTANT

## 2023-01-10 RX ORDER — PROMETHAZINE HYDROCHLORIDE AND DEXTROMETHORPHAN HYDROBROMIDE 6.25; 15 MG/5ML; MG/5ML
5 SYRUP ORAL EVERY 6 HOURS PRN
Qty: 118 ML | Refills: 0 | Status: SHIPPED | OUTPATIENT
Start: 2023-01-10 | End: 2023-09-22

## 2023-01-10 NOTE — PROGRESS NOTES
Subjective:       Patient ID: Odilon Del Rio is a 52 y.o. male.    Chief Complaint: Cough      HPI    Odilon Del Rio is a 52 y.o. male who presents today with complaints of Cough  Then started yesterday.  Reports headache, fatigue, dry cough, body aches, rhinorrhea, nasal congestion and nausea.  Also reports sweats.  Denies chest pain, shortness breast, vomiting, diarrhea or sore throat.  No treatments tried.  Denies known sick contacts.  Symptoms constant unchanged.    Review of Systems   Constitutional:  Negative for chills, fatigue and fever.   HENT:  Positive for congestion. Negative for ear pain and sore throat.    Respiratory:  Positive for cough. Negative for chest tightness, shortness of breath and wheezing.    Cardiovascular:  Negative for chest pain.   Gastrointestinal:  Positive for nausea. Negative for diarrhea and vomiting.   Musculoskeletal:  Positive for myalgias. Negative for arthralgias.   Neurological:  Positive for headaches.     Objective:      Physical Exam  Vitals and nursing note reviewed.   Constitutional:       General: He is not in acute distress.     Appearance: He is well-developed. He is obese.   HENT:      Head: Normocephalic and atraumatic.   Eyes:      General: Lids are normal. No scleral icterus.     Extraocular Movements: Extraocular movements intact.      Conjunctiva/sclera: Conjunctivae normal.   Cardiovascular:      Rate and Rhythm: Normal rate and regular rhythm.   Pulmonary:      Effort: Pulmonary effort is normal.      Breath sounds: Normal breath sounds. No decreased breath sounds, wheezing, rhonchi or rales.   Neurological:      Mental Status: He is alert.      Cranial Nerves: No cranial nerve deficit.   Psychiatric:         Mood and Affect: Mood and affect normal.       Assessment:       1. COVID-19 virus infection    2. Cough, unspecified type        Plan:   1. COVID-19 virus infection  -     COVID-19 Surveillance Program  -     pulse oximeter (PULSE OXIMETER)  device; Use twice daily at 8 AM and 3 PM and record the value in Yandext as directed.  Dispense: 1 each; Refill: 0  -     nirmatrelvir-ritonavir 300 mg (150 mg x 2)-100 mg copackaged tablets (EUA); Take 3 tablets by mouth 2 (two) times daily for 5 days. Each dose contains 2 nirmatrelvir (pink tablets) and 1 ritonavir (white tablet). Take all 3 tablets together  Dispense: 30 tablet; Refill: 0    2. Cough, unspecified type  -     POCT COVID-19 Rapid Screening  -     POCT Influenza A/B Molecular  -     promethazine-dextromethorphan (PROMETHAZINE-DM) 6.25-15 mg/5 mL Syrp; Take 5 mLs by mouth every 6 (six) hours as needed (cough).  Dispense: 118 mL; Refill: 0      Pos Covid  Went over s/e of Paxlovid and stop Cialis while taking medication  ED precautions given

## 2023-01-10 NOTE — TELEPHONE ENCOUNTER
----- Message from Chalino Spicer sent at 1/10/2023 11:30 AM CST -----  Contact: [phone]  Type:  Sooner Apoointment Request    Caller is requesting a sooner appointment.  Caller declined first available appointment listed below.  Caller will not accept being placed on the waitlist and is requesting a message be sent to doctor.  Name of Caller:Odilon   When is the first available appointment?2/2023   Symptoms:body aches/ headaches   Would the patient rather a call back or a response via [facility]? Call back   Best Call Back Number:[phone]  Additional Information:

## 2023-01-10 NOTE — LETTER
January 10, 2023      [facility]  [address] [place] [address]  Phone: [phone]  Fax: [phone]       Patient: Odilon Del Rio   YOB: 1970  Date of Visit: 01/10/2023    To Whom It May Concern:    Monster Del Roi  was at [facility] on 01/10/2023. He may return to work/school on 1/16/23 with no restrictions. If you have any questions or concerns, or if I can be of further assistance, please do not hesitate to contact me.    Sincerely,    Marlin Maher PA-C

## 2023-01-11 ENCOUNTER — PATIENT MESSAGE (OUTPATIENT)
Dept: ADMINISTRATIVE | Facility: OTHER | Age: 53
End: 2023-01-11
Payer: COMMERCIAL

## 2023-01-11 ENCOUNTER — NURSE TRIAGE (OUTPATIENT)
Dept: ADMINISTRATIVE | Facility: CLINIC | Age: 53
End: 2023-01-11
Payer: COMMERCIAL

## 2023-01-11 NOTE — TELEPHONE ENCOUNTER
Pt called for covid surveillance enrollment #1 and no answer left vm to call [facility] if any problems or concerns and i will reach back out this afternoon. Pt was sent previous My chart message with infor for self enrollment I will reach back out later on today   Reason for Disposition   Message left on unidentified voice mail. Phone number verified.    Protocols used: No Contact or Duplicate Contact Call-A-[place]

## 2023-01-12 ENCOUNTER — PATIENT MESSAGE (OUTPATIENT)
Dept: ADMINISTRATIVE | Facility: OTHER | Age: 53
End: 2023-01-12
Payer: COMMERCIAL

## 2023-01-13 ENCOUNTER — PATIENT MESSAGE (OUTPATIENT)
Dept: ADMINISTRATIVE | Facility: OTHER | Age: 53
End: 2023-01-13
Payer: COMMERCIAL

## 2023-01-14 ENCOUNTER — PATIENT MESSAGE (OUTPATIENT)
Dept: ADMINISTRATIVE | Facility: OTHER | Age: 53
End: 2023-01-14
Payer: COMMERCIAL

## 2023-01-15 ENCOUNTER — PATIENT MESSAGE (OUTPATIENT)
Dept: ADMINISTRATIVE | Facility: OTHER | Age: 53
End: 2023-01-15
Payer: COMMERCIAL

## 2023-01-16 ENCOUNTER — PATIENT MESSAGE (OUTPATIENT)
Dept: ADMINISTRATIVE | Facility: OTHER | Age: 53
End: 2023-01-16
Payer: COMMERCIAL

## 2023-01-17 ENCOUNTER — PATIENT MESSAGE (OUTPATIENT)
Dept: ADMINISTRATIVE | Facility: OTHER | Age: 53
End: 2023-01-17
Payer: COMMERCIAL

## 2023-01-18 ENCOUNTER — PATIENT MESSAGE (OUTPATIENT)
Dept: ADMINISTRATIVE | Facility: OTHER | Age: 53
End: 2023-01-18
Payer: COMMERCIAL

## 2023-01-19 ENCOUNTER — PATIENT MESSAGE (OUTPATIENT)
Dept: ADMINISTRATIVE | Facility: OTHER | Age: 53
End: 2023-01-19
Payer: COMMERCIAL

## 2023-01-20 ENCOUNTER — PATIENT MESSAGE (OUTPATIENT)
Dept: ADMINISTRATIVE | Facility: OTHER | Age: 53
End: 2023-01-20
Payer: COMMERCIAL

## 2023-01-21 ENCOUNTER — PATIENT MESSAGE (OUTPATIENT)
Dept: ADMINISTRATIVE | Facility: OTHER | Age: 53
End: 2023-01-21
Payer: COMMERCIAL

## 2023-01-22 ENCOUNTER — PATIENT MESSAGE (OUTPATIENT)
Dept: ADMINISTRATIVE | Facility: OTHER | Age: 53
End: 2023-01-22
Payer: COMMERCIAL

## 2023-01-23 ENCOUNTER — PATIENT MESSAGE (OUTPATIENT)
Dept: ADMINISTRATIVE | Facility: OTHER | Age: 53
End: 2023-01-23
Payer: COMMERCIAL

## 2023-01-24 ENCOUNTER — PATIENT MESSAGE (OUTPATIENT)
Dept: ADMINISTRATIVE | Facility: OTHER | Age: 53
End: 2023-01-24
Payer: COMMERCIAL

## 2023-04-19 ENCOUNTER — PATIENT MESSAGE (OUTPATIENT)
Dept: ADMINISTRATIVE | Facility: HOSPITAL | Age: 53
End: 2023-04-19
Payer: COMMERCIAL

## 2023-06-15 ENCOUNTER — PATIENT OUTREACH (OUTPATIENT)
Dept: ADMINISTRATIVE | Facility: HOSPITAL | Age: 53
End: 2023-06-15
Payer: COMMERCIAL

## 2023-06-15 ENCOUNTER — PATIENT MESSAGE (OUTPATIENT)
Dept: ADMINISTRATIVE | Facility: HOSPITAL | Age: 53
End: 2023-06-15
Payer: COMMERCIAL

## 2023-06-16 ENCOUNTER — LAB VISIT (OUTPATIENT)
Dept: LAB | Facility: HOSPITAL | Age: 53
End: 2023-06-16
Attending: UROLOGY
Payer: COMMERCIAL

## 2023-06-16 DIAGNOSIS — Z12.5 PROSTATE CANCER SCREENING: ICD-10-CM

## 2023-06-16 LAB — COMPLEXED PSA SERPL-MCNC: 0.53 NG/ML (ref 0–4)

## 2023-06-16 PROCEDURE — 84153 ASSAY OF PSA TOTAL: CPT | Performed by: UROLOGY

## 2023-06-16 PROCEDURE — 36415 COLL VENOUS BLD VENIPUNCTURE: CPT | Performed by: UROLOGY

## 2023-06-23 ENCOUNTER — OFFICE VISIT (OUTPATIENT)
Dept: UROLOGY | Facility: CLINIC | Age: 53
End: 2023-06-23
Payer: COMMERCIAL

## 2023-06-23 VITALS
HEART RATE: 71 BPM | BODY MASS INDEX: 41.75 KG/M2 | SYSTOLIC BLOOD PRESSURE: 126 MMHG | WEIGHT: 315 LBS | DIASTOLIC BLOOD PRESSURE: 77 MMHG | HEIGHT: 73 IN

## 2023-06-23 DIAGNOSIS — Z12.5 PROSTATE CANCER SCREENING: ICD-10-CM

## 2023-06-23 DIAGNOSIS — N52.9 ERECTILE DYSFUNCTION, UNSPECIFIED ERECTILE DYSFUNCTION TYPE: Primary | ICD-10-CM

## 2023-06-23 PROCEDURE — 3078F DIAST BP <80 MM HG: CPT | Mod: CPTII,S$GLB,, | Performed by: UROLOGY

## 2023-06-23 PROCEDURE — 3074F SYST BP LT 130 MM HG: CPT | Mod: CPTII,S$GLB,, | Performed by: UROLOGY

## 2023-06-23 PROCEDURE — 99213 OFFICE O/P EST LOW 20 MIN: CPT | Mod: S$GLB,,, | Performed by: UROLOGY

## 2023-06-23 PROCEDURE — 1160F PR REVIEW ALL MEDS BY PRESCRIBER/CLIN PHARMACIST DOCUMENTED: ICD-10-PCS | Mod: CPTII,S$GLB,, | Performed by: UROLOGY

## 2023-06-23 PROCEDURE — 3008F BODY MASS INDEX DOCD: CPT | Mod: CPTII,S$GLB,, | Performed by: UROLOGY

## 2023-06-23 PROCEDURE — 99999 PR PBB SHADOW E&M-EST. PATIENT-LVL III: CPT | Mod: PBBFAC,,, | Performed by: UROLOGY

## 2023-06-23 PROCEDURE — 1159F PR MEDICATION LIST DOCUMENTED IN MEDICAL RECORD: ICD-10-PCS | Mod: CPTII,S$GLB,, | Performed by: UROLOGY

## 2023-06-23 PROCEDURE — 3008F PR BODY MASS INDEX (BMI) DOCUMENTED: ICD-10-PCS | Mod: CPTII,S$GLB,, | Performed by: UROLOGY

## 2023-06-23 PROCEDURE — 1159F MED LIST DOCD IN RCRD: CPT | Mod: CPTII,S$GLB,, | Performed by: UROLOGY

## 2023-06-23 PROCEDURE — 99213 PR OFFICE/OUTPT VISIT, EST, LEVL III, 20-29 MIN: ICD-10-PCS | Mod: S$GLB,,, | Performed by: UROLOGY

## 2023-06-23 PROCEDURE — 3078F PR MOST RECENT DIASTOLIC BLOOD PRESSURE < 80 MM HG: ICD-10-PCS | Mod: CPTII,S$GLB,, | Performed by: UROLOGY

## 2023-06-23 PROCEDURE — 99999 PR PBB SHADOW E&M-EST. PATIENT-LVL III: ICD-10-PCS | Mod: PBBFAC,,, | Performed by: UROLOGY

## 2023-06-23 PROCEDURE — 1160F RVW MEDS BY RX/DR IN RCRD: CPT | Mod: CPTII,S$GLB,, | Performed by: UROLOGY

## 2023-06-23 PROCEDURE — 3074F PR MOST RECENT SYSTOLIC BLOOD PRESSURE < 130 MM HG: ICD-10-PCS | Mod: CPTII,S$GLB,, | Performed by: UROLOGY

## 2023-06-23 RX ORDER — TADALAFIL 10 MG/1
10 TABLET ORAL DAILY PRN
Qty: 30 TABLET | Refills: 11 | Status: SHIPPED | OUTPATIENT
Start: 2023-06-23 | End: 2024-06-22

## 2023-06-23 NOTE — PROGRESS NOTES
Chief Complaint:   Encounter Diagnosis   Name Primary?    Erectile dysfunction, unspecified erectile dysfunction type Yes         HPI:   6/23/23- Cialis is currently working well for the patient, no voiding complaints, PSA is stable.    5/28/20: 48 yo man referred for low T workup.  Uses CPAP.  No abd/pelvic pain and no exac/rel factors.  No hematuria.  No urolithiasis.  No urinary bother.  No  history.  Normal sexual function.  Dropped 70 lbs a few years ago and put it back on.  No family CaP.  T was 400 with a normal free T value calculated.  Feels low desire to do anything, ED is the main .    Allergies:  No known drug allergies    Medications:  has a current medication list which includes the following prescription(s): amlodipine, atenolol, multivitamin, promethazine-dextromethorphan, pulse oximeter, and tadalafil.    Review of Systems:  General: No fever, chills, fatigability, or weight loss.  Skin: No rashes, itching, or changes in color or texture of skin.  Chest: Denies LONG, cyanosis, wheezing, cough, and sputum production.  Abdomen: Appetite fine. No weight loss. Denies diarrhea, abdominal pain, hematemesis, or0 blood in stool.  Musculoskeletal: No joint stiffness or swelling. Denies back pain.  : As above.  All other review of systems negative.    PMH:   has a past medical history of Hypertension, Morbid obesity, CHARLINE (obstructive sleep apnea), and Testosterone deficiency.    PSH:   has no past surgical history on file.    FamHx: family history includes Diabetes in his mother; Hypertension in his father and mother.    SocHx:  reports that he has never smoked. He has never used smokeless tobacco. He reports that he does not drink alcohol and does not use drugs.      Physical Exam:  Vitals:    06/23/23 1111   BP: 126/77   Pulse: 71     General: A&Ox3, no apparent distress, no deformities  Neck: No masses, normal thyroid  Lungs: normal inspiration, no use of accessory muscles  Heart: normal pulse, no  arrhythmias  Abdomen: Soft, NT, ND, no masses, no hernias, no hepatosplenomegaly  Lymphatic: Neck and groin nodes negative  Skin: The skin is warm and dry. No jaundice.  Ext: No c/c/e.    Labs/Studies:   PSA 0.53 6/23    Impression/Plan:      1. Erectile dysfunction- Cialis 10 mg works extremely well for the patient, refill has been sent in for him.  Call with any complaints in the meantime.  Otherwise see me in one year with a psa.    2. PSA screening- PSA stable continue yearly.  No evidence of voiding complaints.

## 2023-07-28 ENCOUNTER — LAB VISIT (OUTPATIENT)
Dept: LAB | Facility: HOSPITAL | Age: 53
End: 2023-07-28
Attending: INTERNAL MEDICINE
Payer: COMMERCIAL

## 2023-07-28 DIAGNOSIS — Z00.00 ANNUAL PHYSICAL EXAM: ICD-10-CM

## 2023-07-28 LAB
ALBUMIN SERPL BCP-MCNC: 3.7 G/DL (ref 3.5–5.2)
ALP SERPL-CCNC: 54 U/L (ref 55–135)
ALT SERPL W/O P-5'-P-CCNC: 25 U/L (ref 10–44)
ANION GAP SERPL CALC-SCNC: 9 MMOL/L (ref 8–16)
AST SERPL-CCNC: 23 U/L (ref 10–40)
BASOPHILS # BLD AUTO: 0.04 K/UL (ref 0–0.2)
BASOPHILS NFR BLD: 0.7 % (ref 0–1.9)
BILIRUB SERPL-MCNC: 0.9 MG/DL (ref 0.1–1)
BUN SERPL-MCNC: 15 MG/DL (ref 6–20)
CALCIUM SERPL-MCNC: 9.7 MG/DL (ref 8.7–10.5)
CHLORIDE SERPL-SCNC: 107 MMOL/L (ref 95–110)
CHOLEST SERPL-MCNC: 197 MG/DL (ref 120–199)
CHOLEST/HDLC SERPL: 3.9 {RATIO} (ref 2–5)
CO2 SERPL-SCNC: 26 MMOL/L (ref 23–29)
CREAT SERPL-MCNC: 1 MG/DL (ref 0.5–1.4)
DIFFERENTIAL METHOD: ABNORMAL
EOSINOPHIL # BLD AUTO: 0.1 K/UL (ref 0–0.5)
EOSINOPHIL NFR BLD: 1.2 % (ref 0–8)
ERYTHROCYTE [DISTWIDTH] IN BLOOD BY AUTOMATED COUNT: 12.4 % (ref 11.5–14.5)
EST. GFR  (NO RACE VARIABLE): >60 ML/MIN/1.73 M^2
GLUCOSE SERPL-MCNC: 87 MG/DL (ref 70–110)
HCT VFR BLD AUTO: 41 % (ref 40–54)
HDLC SERPL-MCNC: 50 MG/DL (ref 40–75)
HDLC SERPL: 25.4 % (ref 20–50)
HGB BLD-MCNC: 13.2 G/DL (ref 14–18)
IMM GRANULOCYTES # BLD AUTO: 0.01 K/UL (ref 0–0.04)
IMM GRANULOCYTES NFR BLD AUTO: 0.2 % (ref 0–0.5)
LDLC SERPL CALC-MCNC: 131 MG/DL (ref 63–159)
LYMPHOCYTES # BLD AUTO: 2.8 K/UL (ref 1–4.8)
LYMPHOCYTES NFR BLD: 47.5 % (ref 18–48)
MCH RBC QN AUTO: 31.9 PG (ref 27–31)
MCHC RBC AUTO-ENTMCNC: 32.2 G/DL (ref 32–36)
MCV RBC AUTO: 99 FL (ref 82–98)
MONOCYTES # BLD AUTO: 0.6 K/UL (ref 0.3–1)
MONOCYTES NFR BLD: 10.3 % (ref 4–15)
NEUTROPHILS # BLD AUTO: 2.4 K/UL (ref 1.8–7.7)
NEUTROPHILS NFR BLD: 40.1 % (ref 38–73)
NONHDLC SERPL-MCNC: 147 MG/DL
NRBC BLD-RTO: 0 /100 WBC
PLATELET # BLD AUTO: 179 K/UL (ref 150–450)
PMV BLD AUTO: 12.3 FL (ref 9.2–12.9)
POTASSIUM SERPL-SCNC: 4.6 MMOL/L (ref 3.5–5.1)
PROT SERPL-MCNC: 7.3 G/DL (ref 6–8.4)
RBC # BLD AUTO: 4.14 M/UL (ref 4.6–6.2)
SODIUM SERPL-SCNC: 142 MMOL/L (ref 136–145)
TRIGL SERPL-MCNC: 80 MG/DL (ref 30–150)
TSH SERPL DL<=0.005 MIU/L-ACNC: 1.4 UIU/ML (ref 0.4–4)
WBC # BLD AUTO: 5.94 K/UL (ref 3.9–12.7)

## 2023-07-28 PROCEDURE — 80053 COMPREHEN METABOLIC PANEL: CPT | Performed by: INTERNAL MEDICINE

## 2023-07-28 PROCEDURE — 36415 COLL VENOUS BLD VENIPUNCTURE: CPT | Performed by: INTERNAL MEDICINE

## 2023-07-28 PROCEDURE — 84443 ASSAY THYROID STIM HORMONE: CPT | Performed by: INTERNAL MEDICINE

## 2023-07-28 PROCEDURE — 85025 COMPLETE CBC W/AUTO DIFF WBC: CPT | Performed by: INTERNAL MEDICINE

## 2023-07-28 PROCEDURE — 80061 LIPID PANEL: CPT | Performed by: INTERNAL MEDICINE

## 2023-08-04 ENCOUNTER — OFFICE VISIT (OUTPATIENT)
Dept: INTERNAL MEDICINE | Facility: CLINIC | Age: 53
End: 2023-08-04
Payer: COMMERCIAL

## 2023-08-04 ENCOUNTER — LAB VISIT (OUTPATIENT)
Dept: LAB | Facility: HOSPITAL | Age: 53
End: 2023-08-04
Attending: INTERNAL MEDICINE
Payer: COMMERCIAL

## 2023-08-04 VITALS
WEIGHT: 315 LBS | DIASTOLIC BLOOD PRESSURE: 90 MMHG | SYSTOLIC BLOOD PRESSURE: 138 MMHG | TEMPERATURE: 98 F | OXYGEN SATURATION: 97 % | HEIGHT: 73 IN | HEART RATE: 66 BPM | RESPIRATION RATE: 20 BRPM | BODY MASS INDEX: 41.75 KG/M2

## 2023-08-04 DIAGNOSIS — E66.01 MORBID OBESITY WITH BMI OF 45.0-49.9, ADULT: ICD-10-CM

## 2023-08-04 DIAGNOSIS — Z00.00 ANNUAL PHYSICAL EXAM: ICD-10-CM

## 2023-08-04 DIAGNOSIS — D64.9 MILD ANEMIA: ICD-10-CM

## 2023-08-04 DIAGNOSIS — Z00.00 ANNUAL PHYSICAL EXAM: Primary | ICD-10-CM

## 2023-08-04 DIAGNOSIS — Z12.11 COLON CANCER SCREENING: ICD-10-CM

## 2023-08-04 DIAGNOSIS — I10 ESSENTIAL HYPERTENSION: ICD-10-CM

## 2023-08-04 LAB
ESTIMATED AVG GLUCOSE: 82 MG/DL (ref 68–131)
FERRITIN SERPL-MCNC: 708 NG/ML (ref 20–300)
HBA1C MFR BLD: 4.5 % (ref 4–5.6)
IRON SERPL-MCNC: 62 UG/DL (ref 45–160)
SATURATED IRON: 20 % (ref 20–50)
TOTAL IRON BINDING CAPACITY: 303 UG/DL (ref 250–450)
TRANSFERRIN SERPL-MCNC: 205 MG/DL (ref 200–375)

## 2023-08-04 PROCEDURE — 84466 ASSAY OF TRANSFERRIN: CPT | Performed by: INTERNAL MEDICINE

## 2023-08-04 PROCEDURE — 99396 PR PREVENTIVE VISIT,EST,40-64: ICD-10-PCS | Mod: S$GLB,,, | Performed by: INTERNAL MEDICINE

## 2023-08-04 PROCEDURE — 83036 HEMOGLOBIN GLYCOSYLATED A1C: CPT | Performed by: INTERNAL MEDICINE

## 2023-08-04 PROCEDURE — 3008F BODY MASS INDEX DOCD: CPT | Mod: CPTII,S$GLB,, | Performed by: INTERNAL MEDICINE

## 2023-08-04 PROCEDURE — 83540 ASSAY OF IRON: CPT | Performed by: INTERNAL MEDICINE

## 2023-08-04 PROCEDURE — 3080F DIAST BP >= 90 MM HG: CPT | Mod: CPTII,S$GLB,, | Performed by: INTERNAL MEDICINE

## 2023-08-04 PROCEDURE — 82728 ASSAY OF FERRITIN: CPT | Performed by: INTERNAL MEDICINE

## 2023-08-04 PROCEDURE — 3075F SYST BP GE 130 - 139MM HG: CPT | Mod: CPTII,S$GLB,, | Performed by: INTERNAL MEDICINE

## 2023-08-04 PROCEDURE — 3080F PR MOST RECENT DIASTOLIC BLOOD PRESSURE >= 90 MM HG: ICD-10-PCS | Mod: CPTII,S$GLB,, | Performed by: INTERNAL MEDICINE

## 2023-08-04 PROCEDURE — 99999 PR PBB SHADOW E&M-EST. PATIENT-LVL V: ICD-10-PCS | Mod: PBBFAC,,, | Performed by: INTERNAL MEDICINE

## 2023-08-04 PROCEDURE — 99396 PREV VISIT EST AGE 40-64: CPT | Mod: S$GLB,,, | Performed by: INTERNAL MEDICINE

## 2023-08-04 PROCEDURE — 36415 COLL VENOUS BLD VENIPUNCTURE: CPT | Performed by: INTERNAL MEDICINE

## 2023-08-04 PROCEDURE — 1159F PR MEDICATION LIST DOCUMENTED IN MEDICAL RECORD: ICD-10-PCS | Mod: CPTII,S$GLB,, | Performed by: INTERNAL MEDICINE

## 2023-08-04 PROCEDURE — 1159F MED LIST DOCD IN RCRD: CPT | Mod: CPTII,S$GLB,, | Performed by: INTERNAL MEDICINE

## 2023-08-04 PROCEDURE — 3008F PR BODY MASS INDEX (BMI) DOCUMENTED: ICD-10-PCS | Mod: CPTII,S$GLB,, | Performed by: INTERNAL MEDICINE

## 2023-08-04 PROCEDURE — 99999 PR PBB SHADOW E&M-EST. PATIENT-LVL V: CPT | Mod: PBBFAC,,, | Performed by: INTERNAL MEDICINE

## 2023-08-04 PROCEDURE — 1160F RVW MEDS BY RX/DR IN RCRD: CPT | Mod: CPTII,S$GLB,, | Performed by: INTERNAL MEDICINE

## 2023-08-04 PROCEDURE — 1160F PR REVIEW ALL MEDS BY PRESCRIBER/CLIN PHARMACIST DOCUMENTED: ICD-10-PCS | Mod: CPTII,S$GLB,, | Performed by: INTERNAL MEDICINE

## 2023-08-04 PROCEDURE — 3075F PR MOST RECENT SYSTOLIC BLOOD PRESS GE 130-139MM HG: ICD-10-PCS | Mod: CPTII,S$GLB,, | Performed by: INTERNAL MEDICINE

## 2023-08-04 NOTE — PROGRESS NOTES
Odilon Tejeda [place]  52 y.o. Black or  male  [id number]    Chief Complaint:  Chief Complaint   Patient presents with    Annual Exam       History of Present Illness:  Presents for an annual.   His b/p is elevated slightly. He is taking his prescribed medication and admits to diet non compliance and weight gain. B/P at annual visit last year was acceptable. He does not check his b/p at home.   Recent labs show mild anemia. He reports fatigue. He denies blood loss. He is due for a colonoscopy.     Laboratory   Lab Results   Component Value Date    WBC 5.94 07/28/2023    HGB 13.2 (L) 07/28/2023    HCT 41.0 07/28/2023     07/28/2023    CHOL 197 07/28/2023    TRIG 80 07/28/2023    HDL 50 07/28/2023    ALT 25 07/28/2023    AST 23 07/28/2023     07/28/2023    K 4.6 07/28/2023     07/28/2023    CREATININE 1.0 07/28/2023    BUN 15 07/28/2023    CO2 26 07/28/2023    TSH 1.404 07/28/2023    PSA 0.53 06/16/2023    HGBA1C 4.7 07/11/2012     Review of Systems   Constitutional:  Positive for malaise/fatigue. Negative for fever and weight loss.   HENT:  Negative for hearing loss.    Eyes:  Negative for blurred vision.   Respiratory:  Positive for cough. Negative for shortness of breath.    Cardiovascular:  Negative for chest pain and leg swelling.   Gastrointestinal:  Negative for abdominal pain, blood in stool, constipation and diarrhea.   Genitourinary:  Negative for hematuria.   Musculoskeletal:  Negative for back pain and neck pain.   Neurological:  Negative for dizziness and headaches.   Psychiatric/Behavioral:  The patient does not have insomnia.        The following were reviewed: Active problem list, medication list, allergies, family history, social history, and Health Maintenance.     History:  Past Medical History:   Diagnosis Date    Hypertension     Morbid obesity     CHARLINE (obstructive sleep apnea)     Testosterone deficiency      History reviewed. No pertinent surgical history.  Family  History   Problem Relation Age of Onset    Hypertension Mother     Diabetes Mother     Hypertension Father      Social History     Socioeconomic History    Marital status:    Occupational History     Employer: [employer]   Tobacco Use    Smoking status: Never    Smokeless tobacco: Never   Substance and Sexual Activity    Alcohol use: No    Drug use: Never    Sexual activity: Yes     Partners: Female     Birth control/protection: Coitus interruptus, Condom   Social History Narrative    ** Merged History Encounter **          Social Determinants of Health     Financial Resource Strain: Low Risk  (7/28/2023)    Overall Financial Resource Strain (CARDIA)     Difficulty of Paying Living Expenses: Not hard at all   Food Insecurity: No Food Insecurity (7/28/2023)    Hunger Vital Sign     Worried About Running Out of Food in the Last Year: Never true     Ran Out of Food in the Last Year: Never true   Transportation Needs: No Transportation Needs (7/28/2023)    PRAPARE - Transportation     Lack of Transportation (Medical): No     Lack of Transportation (Non-Medical): No   Physical Activity: Insufficiently Active (7/28/2023)    Exercise Vital Sign     Days of Exercise per Week: 1 day     Minutes of Exercise per Session: 10 min   Stress: No Stress Concern Present (7/28/2023)    [employer] [place] [employer] - Occupational Stress Questionnaire     Feeling of Stress : Only a little   Social Connections: Unknown (7/28/2023)    Social Connection and Isolation Panel [NHANES]     Frequency of Communication with Friends and Family: More than three times a week     Frequency of Social Gatherings with Friends and Family: Once a week     Active Member of Clubs or Organizations: No     Attends Club or Organization Meetings: Never     Marital Status:    Housing Stability: Low Risk  (7/28/2023)    Housing Stability Vital Sign     Unable to Pay for Housing in the Last Year: No     Number of Places Lived in the Last  Year: 1     Unstable Housing in the Last Year: No     Patient Active Problem List   Diagnosis    Essential hypertension    BMI 40.0-44.9, adult    Hypogonadism male    CHARLINE on CPAP    Erectile dysfunction    Prostate cancer screening     Review of patient's allergies indicates:   Allergen Reactions    No known drug allergies        Health Maintenance  Health Maintenance Topics with due status: Not Due       Topic Last Completion Date    Lipid Panel 07/28/2023    Influenza Vaccine Not Due     Health Maintenance Due   Topic Date Due    TETANUS VACCINE  Never done    Hemoglobin A1c (Diabetic Prevention Screening)  07/11/2015    Colorectal Cancer Screening  Never done    Shingles Vaccine (1 of 2) Never done    COVID-19 Vaccine (4 - Moderna series) 01/08/2022       Medications:  Current Outpatient Medications on File Prior to Visit   Medication Sig Dispense Refill    amLODIPine (NORVASC) 10 MG tablet Take 1 tablet (10 mg total) by mouth once daily. 90 tablet 1    atenoloL (TENORMIN) 50 MG tablet Take 1 tablet (50 mg total) by mouth once daily. 90 tablet 1    multivitamin (THERAGRAN) per tablet Take 1 tablet by mouth once daily.      promethazine-dextromethorphan (PROMETHAZINE-DM) 6.25-15 mg/5 mL Syrp Take 5 mLs by mouth every 6 (six) hours as needed (cough). 118 mL 0    pulse oximeter (PULSE OXIMETER) device Use twice daily at 8 AM and 3 PM and record the value in [facility] as directed. 1 each 0    tadalafiL (CIALIS) 10 MG tablet Take 1 tablet (10 mg total) by mouth daily as needed for Erectile Dysfunction. 30 tablet 11     No current facility-administered medications on file prior to visit.       Medications have been reviewed and reconciled with patient at visit today.    Exam:  Vitals:    08/04/23 0853   BP: (!) 138/90   Pulse:    Resp:    Temp:      Weight: (!) 157.2 kg (346 lb 9 oz)   Body mass index is 45.72 kg/m².      Physical Exam  Vitals reviewed.   Constitutional:       General: He is not in acute distress.      Appearance: He is well-developed. He is obese. He is not ill-appearing.   Eyes:      General: No scleral icterus.  Cardiovascular:      Rate and Rhythm: Normal rate and regular rhythm.      Heart sounds: Normal heart sounds.   Pulmonary:      Effort: Pulmonary effort is normal. No respiratory distress.      Breath sounds: Normal breath sounds.   Abdominal:      General: Bowel sounds are normal.      Palpations: Abdomen is soft.   Skin:     General: Skin is warm and dry.   Neurological:      Mental Status: He is alert and oriented to person, place, and time.   Psychiatric:         Behavior: Behavior normal.       Assessment:  The primary encounter diagnosis was Annual physical exam. Diagnoses of Essential hypertension, Mild anemia, Morbid obesity with BMI of 45.0-49.9, adult, and Colon cancer screening were also pertinent to this visit.    Plan:  Annual physical exam  -     Counseled regarding age appropriate screenings and immunizations  -     Counseled regarding lifestyle modifications  -     Hemoglobin A1C; Future; Expected date: 11/03/2023  -     Iron and TIBC; Future; Expected date: 08/04/2023  -     Ferritin; Future; Expected date: 08/04/2023    Essential hypertension  -     continue amlodipine and atenolol  -     Lifestyle modifications discussed  -     recommend home b/p monitoring     Mild anemia  -     check iron panel  -     recommend colonoscopy     Morbid obesity with BMI of 45.0-49.9, adult  -     Lifestyle modifications discussed    Colon cancer screening  -     Ambulatory referral/consult to Endo Procedure ; Future; Expected date: 08/04/2023      Follow up in about 4 weeks (around 9/1/2023).

## 2023-08-07 ENCOUNTER — HOSPITAL ENCOUNTER (OUTPATIENT)
Dept: PREADMISSION TESTING | Facility: HOSPITAL | Age: 53
Discharge: HOME | End: 2023-08-07
Attending: INTERNAL MEDICINE
Payer: COMMERCIAL

## 2023-08-07 DIAGNOSIS — Z12.11 COLON CANCER SCREENING: Primary | ICD-10-CM

## 2023-08-07 RX ORDER — SODIUM, POTASSIUM,MAG SULFATES 17.5-3.13G
1 SOLUTION, RECONSTITUTED, ORAL ORAL DAILY
Qty: 1 KIT | Refills: 0 | Status: SHIPPED | OUTPATIENT
Start: 2023-08-07 | End: 2023-08-09

## 2023-08-27 DIAGNOSIS — I10 ESSENTIAL HYPERTENSION: ICD-10-CM

## 2023-08-27 NOTE — TELEPHONE ENCOUNTER
No care due was identified.  Health [facility] Embedded Care Due Messages. Reference number: [id number].   8/27/2023 10:08:18 AM CDT

## 2023-08-27 NOTE — TELEPHONE ENCOUNTER
Refill Routing Note   Medication(s) are not appropriate for processing by [facility] for the following reason(s):      Required vitals abnormal    [facility] action(s):  Defer Care Due:  None identified            Appointments  past 12m or future 3m with PCP    Date Provider   Last Visit   8/4/2023 Lisa Osborn DO   Next Visit   Visit date not found Lisa Osborn DO   ED visits in past 90 days: 0        Note composed:12:12 PM 08/27/2023

## 2023-08-29 RX ORDER — AMLODIPINE BESYLATE 10 MG/1
TABLET ORAL
Qty: 90 TABLET | Refills: 3 | Status: SHIPPED | OUTPATIENT
Start: 2023-08-29

## 2023-08-29 RX ORDER — ATENOLOL 50 MG/1
50 TABLET ORAL
Qty: 90 TABLET | Refills: 3 | Status: SHIPPED | OUTPATIENT
Start: 2023-08-29

## 2023-09-22 ENCOUNTER — OFFICE VISIT (OUTPATIENT)
Dept: INTERNAL MEDICINE | Facility: CLINIC | Age: 53
End: 2023-09-22
Payer: COMMERCIAL

## 2023-09-22 VITALS
BODY MASS INDEX: 45.3 KG/M2 | HEART RATE: 71 BPM | DIASTOLIC BLOOD PRESSURE: 96 MMHG | WEIGHT: 315 LBS | SYSTOLIC BLOOD PRESSURE: 136 MMHG | OXYGEN SATURATION: 99 % | TEMPERATURE: 98 F

## 2023-09-22 DIAGNOSIS — I10 ESSENTIAL HYPERTENSION: Primary | ICD-10-CM

## 2023-09-22 PROCEDURE — 3080F PR MOST RECENT DIASTOLIC BLOOD PRESSURE >= 90 MM HG: ICD-10-PCS | Mod: CPTII,S$GLB,, | Performed by: PHYSICIAN ASSISTANT

## 2023-09-22 PROCEDURE — 3008F BODY MASS INDEX DOCD: CPT | Mod: CPTII,S$GLB,, | Performed by: PHYSICIAN ASSISTANT

## 2023-09-22 PROCEDURE — 3044F PR MOST RECENT HEMOGLOBIN A1C LEVEL <7.0%: ICD-10-PCS | Mod: CPTII,S$GLB,, | Performed by: PHYSICIAN ASSISTANT

## 2023-09-22 PROCEDURE — 3075F PR MOST RECENT SYSTOLIC BLOOD PRESS GE 130-139MM HG: ICD-10-PCS | Mod: CPTII,S$GLB,, | Performed by: PHYSICIAN ASSISTANT

## 2023-09-22 PROCEDURE — 3080F DIAST BP >= 90 MM HG: CPT | Mod: CPTII,S$GLB,, | Performed by: PHYSICIAN ASSISTANT

## 2023-09-22 PROCEDURE — 99999 PR PBB SHADOW E&M-EST. PATIENT-LVL IV: CPT | Mod: PBBFAC,,, | Performed by: PHYSICIAN ASSISTANT

## 2023-09-22 PROCEDURE — 1160F PR REVIEW ALL MEDS BY PRESCRIBER/CLIN PHARMACIST DOCUMENTED: ICD-10-PCS | Mod: CPTII,S$GLB,, | Performed by: PHYSICIAN ASSISTANT

## 2023-09-22 PROCEDURE — 99214 PR OFFICE/OUTPT VISIT, EST, LEVL IV, 30-39 MIN: ICD-10-PCS | Mod: S$GLB,,, | Performed by: PHYSICIAN ASSISTANT

## 2023-09-22 PROCEDURE — 3008F PR BODY MASS INDEX (BMI) DOCUMENTED: ICD-10-PCS | Mod: CPTII,S$GLB,, | Performed by: PHYSICIAN ASSISTANT

## 2023-09-22 PROCEDURE — 3044F HG A1C LEVEL LT 7.0%: CPT | Mod: CPTII,S$GLB,, | Performed by: PHYSICIAN ASSISTANT

## 2023-09-22 PROCEDURE — 1159F PR MEDICATION LIST DOCUMENTED IN MEDICAL RECORD: ICD-10-PCS | Mod: CPTII,S$GLB,, | Performed by: PHYSICIAN ASSISTANT

## 2023-09-22 PROCEDURE — 3075F SYST BP GE 130 - 139MM HG: CPT | Mod: CPTII,S$GLB,, | Performed by: PHYSICIAN ASSISTANT

## 2023-09-22 PROCEDURE — 99999 PR PBB SHADOW E&M-EST. PATIENT-LVL IV: ICD-10-PCS | Mod: PBBFAC,,, | Performed by: PHYSICIAN ASSISTANT

## 2023-09-22 PROCEDURE — 1159F MED LIST DOCD IN RCRD: CPT | Mod: CPTII,S$GLB,, | Performed by: PHYSICIAN ASSISTANT

## 2023-09-22 PROCEDURE — 99214 OFFICE O/P EST MOD 30 MIN: CPT | Mod: S$GLB,,, | Performed by: PHYSICIAN ASSISTANT

## 2023-09-22 PROCEDURE — 1160F RVW MEDS BY RX/DR IN RCRD: CPT | Mod: CPTII,S$GLB,, | Performed by: PHYSICIAN ASSISTANT

## 2023-09-22 RX ORDER — LOSARTAN POTASSIUM 25 MG/1
25 TABLET ORAL DAILY
Qty: 90 TABLET | Refills: 3 | Status: CANCELLED | OUTPATIENT
Start: 2023-09-22 | End: 2024-09-21

## 2023-10-16 ENCOUNTER — HOSPITAL ENCOUNTER (OUTPATIENT)
Facility: HOSPITAL | Age: 53
Discharge: HOME | End: 2023-10-16
Attending: INTERNAL MEDICINE | Admitting: INTERNAL MEDICINE
Payer: COMMERCIAL

## 2023-10-16 ENCOUNTER — ANESTHESIA EVENT (OUTPATIENT)
Dept: ENDOSCOPY | Facility: HOSPITAL | Age: 53
End: 2023-10-16
Payer: COMMERCIAL

## 2023-10-16 ENCOUNTER — ANESTHESIA (OUTPATIENT)
Dept: ENDOSCOPY | Facility: HOSPITAL | Age: 53
End: 2023-10-16
Payer: COMMERCIAL

## 2023-10-16 DIAGNOSIS — Z12.11 COLON CANCER SCREENING: ICD-10-CM

## 2023-10-16 PROCEDURE — 88305 TISSUE EXAM BY PATHOLOGIST: CPT | Mod: 26,,, | Performed by: PATHOLOGY

## 2023-10-16 PROCEDURE — 37000009 HC ANESTHESIA EA ADD 15 MINS: Performed by: INTERNAL MEDICINE

## 2023-10-16 PROCEDURE — 63600175 PHARM REV CODE 636 W HCPCS: Performed by: NURSE ANESTHETIST, CERTIFIED REGISTERED

## 2023-10-16 PROCEDURE — 45385 COLONOSCOPY W/LESION REMOVAL: CPT | Mod: PT,,, | Performed by: INTERNAL MEDICINE

## 2023-10-16 PROCEDURE — 45385 COLONOSCOPY W/LESION REMOVAL: CPT | Mod: PT | Performed by: INTERNAL MEDICINE

## 2023-10-16 PROCEDURE — 27201089 HC SNARE, DISP (ANY): Performed by: INTERNAL MEDICINE

## 2023-10-16 PROCEDURE — 88305 TISSUE EXAM BY PATHOLOGIST: ICD-10-PCS | Mod: 26,,, | Performed by: PATHOLOGY

## 2023-10-16 PROCEDURE — 37000008 HC ANESTHESIA 1ST 15 MINUTES: Performed by: INTERNAL MEDICINE

## 2023-10-16 PROCEDURE — 25000003 PHARM REV CODE 250: Performed by: NURSE ANESTHETIST, CERTIFIED REGISTERED

## 2023-10-16 PROCEDURE — 25000003 PHARM REV CODE 250: Performed by: INTERNAL MEDICINE

## 2023-10-16 PROCEDURE — 45385 PR COLONOSCOPY,REMV LESN,SNARE: ICD-10-PCS | Mod: PT,,, | Performed by: INTERNAL MEDICINE

## 2023-10-16 PROCEDURE — 88305 TISSUE EXAM BY PATHOLOGIST: CPT | Performed by: PATHOLOGY

## 2023-10-16 RX ORDER — LIDOCAINE HYDROCHLORIDE 10 MG/ML
INJECTION, SOLUTION EPIDURAL; INFILTRATION; INTRACAUDAL; PERINEURAL
Status: DISCONTINUED | OUTPATIENT
Start: 2023-10-16 | End: 2023-10-16

## 2023-10-16 RX ORDER — DEXTROMETHORPHAN/PSEUDOEPHED 2.5-7.5/.8
DROPS ORAL
Status: DISCONTINUED | OUTPATIENT
Start: 2023-10-16 | End: 2023-10-16 | Stop reason: HOSPADM

## 2023-10-16 RX ORDER — PROPOFOL 10 MG/ML
VIAL (ML) INTRAVENOUS
Status: DISCONTINUED | OUTPATIENT
Start: 2023-10-16 | End: 2023-10-16

## 2023-10-16 RX ADMIN — LIDOCAINE HYDROCHLORIDE 50 MG: 10 SOLUTION INTRAVENOUS at 09:10

## 2023-10-16 RX ADMIN — Medication 50 MG: at 09:10

## 2023-10-16 RX ADMIN — SODIUM CHLORIDE, POTASSIUM CHLORIDE, SODIUM LACTATE AND CALCIUM CHLORIDE: 600; 310; 30; 20 INJECTION, SOLUTION INTRAVENOUS at 09:10

## 2023-10-16 NOTE — PLAN OF CARE
DR JOLLEY AT BEDSIDE TO SPEAK TO PT. REGARDING RESULTS.  VSS, NO GI BLEEDING, NO ABD. PAIN, NO N/V. PT. DISCHARGED FROM UNIT.

## 2023-10-16 NOTE — PROVATION PATIENT INSTRUCTIONS
Discharge Summary/Instructions after an Endoscopic Procedure  Patient Name: Odilon Del Rio  Patient MRN: [id number]  Patient YOB: 1970 Monday, October 16, 2023 Israel Simmons MD  Dear patient,  As a result of recent federal legislation (The Federal Cures Act), you may   receive lab or pathology results from your procedure in your [facility]   account before your physician is able to contact you. Your physician or   their representative will relay the results to you with their   recommendations at their soonest availability.  Thank you,  RESTRICTIONS:  During your procedure today, you received medications for sedation.  These   medications may affect your judgment, balance and coordination.  Therefore,   for 24 hours, you have the following restrictions:   - DO NOT drive a car, operate machinery, make legal/financial decisions,   sign important papers or drink alcohol.    ACTIVITY:  Today: no heavy lifting, straining or running due to procedural   sedation/anesthesia.  The following day: return to full activity including work.  DIET:  Eat and drink normally unless instructed otherwise.     TREATMENT FOR COMMON SIDE EFFECTS:  - Mild abdominal pain, nausea, belching, bloating or excessive gas:  rest,   eat lightly and use a heating pad.  - Sore Throat: treat with throat lozenges and/or gargle with warm salt   water.  - Because air was used during the procedure, expelling large amounts of air   from your rectum or belching is normal.  - If a bowel prep was taken, you may not have a bowel movement for 1-3 days.    This is normal.  SYMPTOMS TO WATCH FOR AND REPORT TO YOUR PHYSICIAN:  1. Abdominal pain or bloating, other than gas cramps.  2. Chest pain.  3. Back pain.  4. Signs of infection such as: chills or fever occurring within 24 hours   after the procedure.  5. Rectal bleeding, which would show as bright red, maroon, or black stools.   (A tablespoon of blood from the rectum is not serious, especially  if   hemorrhoids are present.)  6. Vomiting.  7. Weakness or dizziness.  GO DIRECTLY TO THE NEAREST EMERGENCY ROOM IF YOU HAVE ANY OF THE FOLLOWING:      Difficulty breathing              Chills and/or fever over 101 F   Persistent vomiting and/or vomiting blood   Severe abdominal pain   Severe chest pain   Black, tarry stools   Bleeding- more than one tablespoon   Any other symptom or condition that you feel may need urgent attention  Your doctor recommends these additional instructions:  If any biopsies were taken, your doctors clinic will contact you in 1 to 2   weeks with any results.  - Discharge patient to home.   - Resume previous diet.   - Continue present medications.   - Await pathology results.   - Repeat colonoscopy in 5 years for surveillance.   - Return to referring physician as previously scheduled.   - Patient has a contact number available for emergencies.  The signs and   symptoms of potential delayed complications were discussed with the   patient.  Return to normal activities tomorrow.  Written discharge   instructions were provided to the patient.  For questions, problems or results please call your physician Israel Simmons MD at Work:  [phone]  If you have any questions about the above instructions, call the GI   department at [phone] or call the endoscopy unit at [phone]   from 7am until 3 pm.  [facility], EMERGENCY ROOM PHONE NUMBER:   [phone]  IF A COMPLICATION OR EMERGENCY SITUATION ARISES AND YOU ARE UNABLE TO REACH   YOUR PHYSICIAN - GO DIRECTLY TO THE EMERGENCY ROOM.  I have read or have had read to me these discharge instructions for my   procedure and have received a written copy.  I understand these   instructions and will follow-up with my physician if I have any questions.     __________________________________       _____________________________________  Nurse Signature                                           Patient/Designated   Responsible Party Signature  MD Israel Church MD  10/16/2023 9:59:02 AM  This report has been verified and signed electronically.  Dear patient,  As a result of recent federal legislation (The Federal Cures Act), you may   receive lab or pathology results from your procedure in your [facility]   account before your physician is able to contact you. Your physician or   their representative will relay the results to you with their   recommendations at their soonest availability.  Thank you,  PROVATION

## 2023-10-16 NOTE — DISCHARGE SUMMARY
[facility] - Endoscopy (Hospital)  Discharge Note  Short Stay    Procedure(s) (LRB):  COLONOSCOPY (N/A)      OUTCOME: Patient tolerated treatment/procedure well without complication and is now ready for discharge.    DISPOSITION: Home or Self Care    FINAL DIAGNOSIS:  Colon cancer screening    FOLLOWUP: With primary care provider    DISCHARGE INSTRUCTIONS:  No discharge procedures on file.     TIME SPENT ON DISCHARGE: 20 minutes

## 2023-10-16 NOTE — H&P
Endoscopy History and Physical    PCP - Lisa Osborn DO  Referring Physician - Lisa Osborn DO  [id number] [facility] [place] [id number]      ASA - per anesthesia  Mallampati - per anesthesia  History of Anesthesia problems - no  Family history Anesthesia problems -  no   Plan of anesthesia - General    HPI  53 y.o. male    Planned Procedure: Colonoscopy  Diagnosis: screening for colon cancer  Chief Complaint: Same as above    Personnel H/o colon polyps:index  FH of colon cancer:no  Anticoagulation:no      ROS:  Constitutional: No fevers, chills, No weight loss  CV: No chest pain  Pulm: No cough, No shortness of breath  GI: see HPI    Medical History:  has a past medical history of Hypertension, Morbid obesity, CHARLINE (obstructive sleep apnea), and Testosterone deficiency.    Surgical History:  has no past surgical history on file.    Family History: family history includes Diabetes in his mother; Hypertension in his father and mother..    Social History:  reports that he has never smoked. He has never used smokeless tobacco. He reports current alcohol use. He reports that he does not use drugs.    Review of patient's allergies indicates:   Allergen Reactions    No known drug allergies        Medications:   Medications Prior to Admission   Medication Sig Dispense Refill Last Dose    amLODIPine (NORVASC) 10 MG tablet Take 1 tablet (10 mg total) by mouth once daily. 90 tablet 3 10/16/2023    atenoloL (TENORMIN) 50 MG tablet Take 1 tablet by mouth once daily 90 tablet 3 10/16/2023    multivitamin (THERAGRAN) per tablet Take 1 tablet by mouth once daily.   10/15/2023    tadalafiL (CIALIS) 10 MG tablet Take 1 tablet (10 mg total) by mouth daily as needed for Erectile Dysfunction. 30 tablet 11        Physical Exam:    Vital Signs:   Vitals:    10/16/23 0920   BP: (!) 141/84   Pulse: 61   Resp: 20   Temp: 98.8 °F (37.1 °C)       General Appearance: Well appearing in no acute distress  Abdomen: Soft, non tender,  non distended with normal bowel sounds, no masses    Labs:  Lab Results   Component Value Date    WBC 5.94 07/28/2023    HGB 13.2 (L) 07/28/2023    HCT 41.0 07/28/2023     07/28/2023    CHOL 197 07/28/2023    TRIG 80 07/28/2023    HDL 50 07/28/2023    ALT 25 07/28/2023    AST 23 07/28/2023     07/28/2023    K 4.6 07/28/2023     07/28/2023    CREATININE 1.0 07/28/2023    BUN 15 07/28/2023    CO2 26 07/28/2023    TSH 1.404 07/28/2023    PSA 0.53 06/16/2023    HGBA1C 4.5 08/04/2023       I have explained the risks and benefits of this endoscopic procedure to the patient including but not limited to bleeding, inflammation, infection, perforation, and death.    SEDATION PLAN: per anesthesia       History reviewed, vital signs satisfactory, cardiopulmonary status satisfactory, sedation options, risks and plans have been discussed with the patient  All their questions were answered and the patient agrees to the sedation procedures as planned and the patient is deemed an appropriate candidate for the sedation as planned.     The risks, benefits and alternatives of the procedure were discussed with the patient in detail. This discussion was had in the presence of endoscopy staff. The risks include, risks of adverse reaction to sedation requiring the use of reversal agents, bleeding requiring blood transfusion, perforation requiring surgical intervention and technical failure. Other risks include aspiration leading to respiratory distress and respiratory failure resulting in endotracheal intubation and mechanical ventilation including death. If anesthesia is being utilized for this procedure, it is up to the anesthesiologist to determine airway safety including elective endotracheal intubation. Questions were answered, they agree to proceed. There was no language barriers.       Procedure explained to patient, informed consent obtained and placed in chart.       Israel Simmons MD

## 2023-10-16 NOTE — ANESTHESIA POSTPROCEDURE EVALUATION
Anesthesia Post Evaluation    Patient: Odilon Del Rio    Procedure(s) Performed: Procedure(s) (LRB):  COLONOSCOPY (N/A)    Final Anesthesia Type: MAC      Patient location during evaluation: GI PACU  Patient participation: Yes- Able to Participate  Level of consciousness: awake and alert  Post-procedure vital signs: reviewed and stable  Pain management: adequate  Airway patency: patent    PONV status at discharge: No PONV  Anesthetic complications: no      Cardiovascular status: blood pressure returned to baseline, hemodynamically stable and stable  Respiratory status: unassisted, room air and spontaneous ventilation  Hydration status: euvolemic  Follow-up not needed.          Vitals Value Taken Time   /83 10/16/23 1020   Temp 37.1 °C (98.8 °F) 10/16/23 1000   Pulse 54 10/16/23 1020   Resp 16 10/16/23 1020   SpO2 98 % 10/16/23 1020         Event Time   Out of Recovery 10:53:27         Pain/Ezio Score: Ezio Score: 10 (10/16/2023 10:20 AM)        
No

## 2023-10-16 NOTE — TRANSFER OF CARE
"Anesthesia Transfer of Care Note    Patient: Odilon Del Rio    Procedure(s) Performed: Procedure(s) (LRB):  COLONOSCOPY (N/A)    Patient location: GI    Anesthesia Type: MAC    Transport from OR: Transported from OR on room air with adequate spontaneous ventilation    Post pain: adequate analgesia    Post assessment: no apparent anesthetic complications and tolerated procedure well    Post vital signs: stable    Level of consciousness: responds to stimulation    Nausea/Vomiting: no nausea/vomiting    Complications: none    Transfer of care protocol was followed      Last vitals:   Visit Vitals  BP (!) 141/84 (BP Location: Left arm, Patient Position: Lying)   Pulse 61   Temp 37.1 °C (98.8 °F)   Resp 20   Ht 6' 1" (1.854 m)   Wt (!) 154.2 kg (340 lb)   SpO2 98%   BMI 44.86 kg/m²     "

## 2023-10-16 NOTE — ANESTHESIA PREPROCEDURE EVALUATION
10/16/2023  Odilon Del Rio is a 53 y.o., maleo    Patient Active Problem List   Diagnosis    Essential hypertension    BMI 40.0-44.9, adult    Hypogonadism male    CHARLINE on CPAP    Erectile dysfunction    Prostate cancer screening       History reviewed. No pertinent surgical history..      Pre-op Assessment    I have reviewed the Patient Summary Reports.     I have reviewed the Nursing Notes. I have reviewed the NPO Status.   I have reviewed the Medications.     Review of Systems  Cardiovascular:   Hypertension    Pulmonary:   Sleep Apnea    Hepatic/GI:   Bowel Prep.        Physical Exam  General: Well nourished, Cooperative, Alert and Oriented    Airway:  Mallampati: II   Mouth Opening: Normal  TM Distance: Normal  Tongue: Normal  Neck ROM: Normal ROM    Dental:  Intact        Anesthesia Plan  Type of Anesthesia, risks & benefits discussed:    Anesthesia Type: MAC, Gen ETT  Intra-op Monitoring Plan: Standard ASA Monitors  Post Op Pain Control Plan: multimodal analgesia  Induction:  IV  Informed Consent: Informed consent signed with the Patient and all parties understand the risks and agree with anesthesia plan.  All questions answered.   ASA Score: 3  Day of Surgery Review of History & Physical: H&P Update referred to the surgeon/provider.    Ready For Surgery From Anesthesia Perspective.     .

## 2023-10-17 VITALS
RESPIRATION RATE: 16 BRPM | HEIGHT: 73 IN | BODY MASS INDEX: 41.75 KG/M2 | SYSTOLIC BLOOD PRESSURE: 129 MMHG | DIASTOLIC BLOOD PRESSURE: 83 MMHG | WEIGHT: 315 LBS | HEART RATE: 54 BPM | TEMPERATURE: 99 F | OXYGEN SATURATION: 98 %

## 2023-10-19 LAB
FINAL PATHOLOGIC DIAGNOSIS: NORMAL
GROSS: NORMAL
Lab: NORMAL

## 2024-06-21 ENCOUNTER — LAB VISIT (OUTPATIENT)
Dept: LAB | Facility: HOSPITAL | Age: 54
End: 2024-06-21
Attending: UROLOGY
Payer: COMMERCIAL

## 2024-06-21 DIAGNOSIS — Z12.5 PROSTATE CANCER SCREENING: ICD-10-CM

## 2024-06-21 LAB — COMPLEXED PSA SERPL-MCNC: 0.49 NG/ML (ref 0–4)

## 2024-06-21 PROCEDURE — 36415 COLL VENOUS BLD VENIPUNCTURE: CPT | Performed by: UROLOGY

## 2024-06-21 PROCEDURE — 84153 ASSAY OF PSA TOTAL: CPT | Performed by: UROLOGY

## 2024-06-28 ENCOUNTER — OFFICE VISIT (OUTPATIENT)
Dept: UROLOGY | Facility: CLINIC | Age: 54
End: 2024-06-28
Payer: COMMERCIAL

## 2024-06-28 VITALS
WEIGHT: 315 LBS | BODY MASS INDEX: 41.75 KG/M2 | SYSTOLIC BLOOD PRESSURE: 137 MMHG | HEART RATE: 58 BPM | HEIGHT: 73 IN | DIASTOLIC BLOOD PRESSURE: 89 MMHG

## 2024-06-28 DIAGNOSIS — Z12.5 PROSTATE CANCER SCREENING: ICD-10-CM

## 2024-06-28 DIAGNOSIS — N52.9 ERECTILE DYSFUNCTION, UNSPECIFIED ERECTILE DYSFUNCTION TYPE: Primary | ICD-10-CM

## 2024-06-28 PROCEDURE — 99999 PR PBB SHADOW E&M-EST. PATIENT-LVL III: CPT | Mod: PBBFAC,,, | Performed by: UROLOGY

## 2024-06-28 RX ORDER — TADALAFIL 20 MG/1
20 TABLET ORAL
Qty: 20 TABLET | Refills: 11 | Status: SHIPPED | OUTPATIENT
Start: 2024-06-28

## 2024-06-28 NOTE — PROGRESS NOTES
Chief Complaint:   Encounter Diagnoses   Name Primary?    Erectile dysfunction, unspecified erectile dysfunction type Yes    Prostate cancer screening          HPI:   6/28/24- patient's insurance recently dropped Cialis, he is looking for other options.  PSA appears to be stable, no voiding complaints.    5/28/20: 48 yo man referred for low T workup.  Uses CPAP.  No abd/pelvic pain and no exac/rel factors.  No hematuria.  No urolithiasis.  No urinary bother.  No  history.  Normal sexual function.  Dropped 70 lbs a few years ago and put it back on.  No family CaP.  T was 400 with a normal free T value calculated.  Feels low desire to do anything, ED is the main .    Allergies:  No known drug allergies    Medications:  has a current medication list which includes the following prescription(s): amlodipine, atenolol, multivitamin, and tadalafil.    Review of Systems:  General: No fever, chills, fatigability, or weight loss.  Skin: No rashes, itching, or changes in color or texture of skin.  Chest: Denies LONG, cyanosis, wheezing, cough, and sputum production.  Abdomen: Appetite fine. No weight loss. Denies diarrhea, abdominal pain, hematemesis, or0 blood in stool.  Musculoskeletal: No joint stiffness or swelling. Denies back pain.  : As above.  All other review of systems negative.    PMH:   has a past medical history of Hypertension, Morbid obesity, CHARLINE (obstructive sleep apnea), and Testosterone deficiency.    PSH:   has a past surgical history that includes Colonoscopy (N/A, 10/16/2023).    FamHx: family history includes Diabetes in his mother; Hypertension in his father and mother.    SocHx:  reports that he has never smoked. He has never used smokeless tobacco. He reports current alcohol use. He reports that he does not use drugs.      Physical Exam:  Vitals:    06/28/24 1011   BP: 137/89   Pulse: (!) 58     General: A&Ox3, no apparent distress, no deformities  Neck: No masses, normal thyroid  Lungs:  normal inspiration, no use of accessory muscles  Heart: normal pulse, no arrhythmias  Abdomen: Soft, NT, ND, no masses, no hernias, no hepatosplenomegaly  Lymphatic: Neck and groin nodes negative  Skin: The skin is warm and dry. No jaundice.  Ext: No c/c/e.    Labs/Studies:   PSA 0.49 6/24    Impression/Plan:       1. Erectile dysfunction- Cialis 10 mg was working well but his insurance dropped it, we will attempt again and represcribe, he will try the good Rx card.  Call with any issues prior to the next appointment in 1 year with a PSA.    2. PSA screening- PSA stable continue yearly.  No evidence of voiding complaints.

## 2024-07-08 ENCOUNTER — OFFICE VISIT (OUTPATIENT)
Dept: INTERNAL MEDICINE | Facility: CLINIC | Age: 54
End: 2024-07-08
Payer: COMMERCIAL

## 2024-07-08 VITALS
RESPIRATION RATE: 20 BRPM | BODY MASS INDEX: 47.15 KG/M2 | WEIGHT: 315 LBS | HEART RATE: 67 BPM | OXYGEN SATURATION: 96 % | TEMPERATURE: 97 F | DIASTOLIC BLOOD PRESSURE: 88 MMHG | SYSTOLIC BLOOD PRESSURE: 138 MMHG

## 2024-07-08 DIAGNOSIS — Z00.00 PREVENTATIVE HEALTH CARE: ICD-10-CM

## 2024-07-08 DIAGNOSIS — I10 ESSENTIAL HYPERTENSION: Primary | ICD-10-CM

## 2024-07-08 PROCEDURE — 1160F RVW MEDS BY RX/DR IN RCRD: CPT | Mod: CPTII,S$GLB,, | Performed by: PHYSICIAN ASSISTANT

## 2024-07-08 PROCEDURE — 99999 PR PBB SHADOW E&M-EST. PATIENT-LVL III: CPT | Mod: PBBFAC,,, | Performed by: PHYSICIAN ASSISTANT

## 2024-07-08 PROCEDURE — 3079F DIAST BP 80-89 MM HG: CPT | Mod: CPTII,S$GLB,, | Performed by: PHYSICIAN ASSISTANT

## 2024-07-08 PROCEDURE — G2211 COMPLEX E/M VISIT ADD ON: HCPCS | Mod: S$GLB,,, | Performed by: PHYSICIAN ASSISTANT

## 2024-07-08 PROCEDURE — 99214 OFFICE O/P EST MOD 30 MIN: CPT | Mod: S$GLB,,, | Performed by: PHYSICIAN ASSISTANT

## 2024-07-08 PROCEDURE — 1159F MED LIST DOCD IN RCRD: CPT | Mod: CPTII,S$GLB,, | Performed by: PHYSICIAN ASSISTANT

## 2024-07-08 PROCEDURE — 3075F SYST BP GE 130 - 139MM HG: CPT | Mod: CPTII,S$GLB,, | Performed by: PHYSICIAN ASSISTANT

## 2024-07-08 PROCEDURE — 3008F BODY MASS INDEX DOCD: CPT | Mod: CPTII,S$GLB,, | Performed by: PHYSICIAN ASSISTANT

## 2024-07-08 RX ORDER — AMLODIPINE BESYLATE 10 MG/1
TABLET ORAL
Qty: 90 TABLET | Refills: 3 | Status: SHIPPED | OUTPATIENT
Start: 2024-07-08

## 2024-07-08 RX ORDER — ATENOLOL 50 MG/1
50 TABLET ORAL DAILY
Qty: 90 TABLET | Refills: 3 | Status: SHIPPED | OUTPATIENT
Start: 2024-07-08

## 2024-07-08 NOTE — PROGRESS NOTES
Subjective:      Patient ID: Odilon Del Rio is a 53 y.o. male.    Chief Complaint: Follow-up (He is here for a follow up. States no issues or concerns to discuss. )    Patient is known to me, being seen today for follow up.     HTN- amlodipine 10mg, atenolol 50mg     Due for annual/labs     Last visit Sept 2023 with myself.       Review of Systems   Constitutional:  Negative for chills, diaphoresis and fever.   HENT:  Negative for congestion, rhinorrhea and sore throat.    Respiratory:  Negative for cough, shortness of breath and wheezing.    Cardiovascular:  Positive for leg swelling (at end of work day, improves w rest).   Gastrointestinal:  Negative for abdominal pain, constipation, diarrhea, nausea and vomiting.   Skin:  Negative for rash.   Neurological:  Negative for dizziness, light-headedness and headaches.       Objective:   /88 (BP Location: Left arm, Patient Position: Sitting, BP Method: Large (Manual))   Pulse 67   Temp 97.2 °F (36.2 °C) (Tympanic)   Resp 20   Wt (!) 162.1 kg (357 lb 5.9 oz)   SpO2 96%   BMI 47.15 kg/m²   Physical Exam  Constitutional:       General: He is not in acute distress.     Appearance: Normal appearance. He is well-developed. He is not ill-appearing.   HENT:      Head: Normocephalic and atraumatic.   Cardiovascular:      Rate and Rhythm: Normal rate and regular rhythm.      Heart sounds: Normal heart sounds. No murmur heard.  Pulmonary:      Effort: Pulmonary effort is normal. No respiratory distress.      Breath sounds: Normal breath sounds. No decreased breath sounds.   Musculoskeletal:      Right lower leg: Swelling present. No edema.      Left lower leg: Swelling present. No edema.   Skin:     General: Skin is warm and dry.      Findings: No rash.   Psychiatric:         Speech: Speech normal.         Behavior: Behavior normal.         Thought Content: Thought content normal.       Assessment:      1. Essential hypertension    2. Preventative health care        Plan:   Essential hypertension  -     amLODIPine (NORVASC) 10 MG tablet; Take 1 tablet (10 mg total) by mouth once daily.  Dispense: 90 tablet; Refill: 3  -     atenoloL (TENORMIN) 50 MG tablet; Take 1 tablet (50 mg total) by mouth once daily.  Dispense: 90 tablet; Refill: 3    Preventative health care  -     CBC Auto Differential; Future; Expected date: 07/08/2024  -     Comprehensive Metabolic Panel; Future; Expected date: 07/08/2024  -     Hemoglobin A1C; Future; Expected date: 07/08/2024  -     Lipid Panel; Future; Expected date: 07/08/2024      Limit salt intake, ensure adequate hydration, elevate legs at end of the day     Fasting labs and annual w PCP to be scheduled

## 2024-12-11 ENCOUNTER — LAB VISIT (OUTPATIENT)
Dept: LAB | Facility: HOSPITAL | Age: 54
End: 2024-12-11
Attending: PHYSICIAN ASSISTANT
Payer: COMMERCIAL

## 2024-12-11 DIAGNOSIS — Z00.00 PREVENTATIVE HEALTH CARE: ICD-10-CM

## 2024-12-11 LAB
ALBUMIN SERPL BCP-MCNC: 3.6 G/DL (ref 3.5–5.2)
ALP SERPL-CCNC: 54 U/L (ref 40–150)
ALT SERPL W/O P-5'-P-CCNC: 25 U/L (ref 10–44)
ANION GAP SERPL CALC-SCNC: 9 MMOL/L (ref 8–16)
AST SERPL-CCNC: 28 U/L (ref 10–40)
BASOPHILS # BLD AUTO: 0.04 K/UL (ref 0–0.2)
BASOPHILS NFR BLD: 0.7 % (ref 0–1.9)
BILIRUB SERPL-MCNC: 1 MG/DL (ref 0.1–1)
BUN SERPL-MCNC: 14 MG/DL (ref 6–20)
CALCIUM SERPL-MCNC: 9.4 MG/DL (ref 8.7–10.5)
CHLORIDE SERPL-SCNC: 108 MMOL/L (ref 95–110)
CHOLEST SERPL-MCNC: 189 MG/DL (ref 120–199)
CHOLEST/HDLC SERPL: 4.3 {RATIO} (ref 2–5)
CO2 SERPL-SCNC: 25 MMOL/L (ref 23–29)
CREAT SERPL-MCNC: 1.1 MG/DL (ref 0.5–1.4)
DIFFERENTIAL METHOD BLD: ABNORMAL
EOSINOPHIL # BLD AUTO: 0.1 K/UL (ref 0–0.5)
EOSINOPHIL NFR BLD: 1.5 % (ref 0–8)
ERYTHROCYTE [DISTWIDTH] IN BLOOD BY AUTOMATED COUNT: 12 % (ref 11.5–14.5)
EST. GFR  (NO RACE VARIABLE): >60 ML/MIN/1.73 M^2
ESTIMATED AVG GLUCOSE: 85 MG/DL (ref 68–131)
GLUCOSE SERPL-MCNC: 92 MG/DL (ref 70–110)
HBA1C MFR BLD: 4.6 % (ref 4–5.6)
HCT VFR BLD AUTO: 41.8 % (ref 40–54)
HDLC SERPL-MCNC: 44 MG/DL (ref 40–75)
HDLC SERPL: 23.3 % (ref 20–50)
HGB BLD-MCNC: 13.4 G/DL (ref 14–18)
IMM GRANULOCYTES # BLD AUTO: 0.02 K/UL (ref 0–0.04)
IMM GRANULOCYTES NFR BLD AUTO: 0.3 % (ref 0–0.5)
LDLC SERPL CALC-MCNC: 126.8 MG/DL (ref 63–159)
LYMPHOCYTES # BLD AUTO: 2.5 K/UL (ref 1–4.8)
LYMPHOCYTES NFR BLD: 41.8 % (ref 18–48)
MCH RBC QN AUTO: 30.7 PG (ref 27–31)
MCHC RBC AUTO-ENTMCNC: 32.1 G/DL (ref 32–36)
MCV RBC AUTO: 96 FL (ref 82–98)
MONOCYTES # BLD AUTO: 0.6 K/UL (ref 0.3–1)
MONOCYTES NFR BLD: 10.4 % (ref 4–15)
NEUTROPHILS # BLD AUTO: 2.7 K/UL (ref 1.8–7.7)
NEUTROPHILS NFR BLD: 45.3 % (ref 38–73)
NONHDLC SERPL-MCNC: 145 MG/DL
NRBC BLD-RTO: 0 /100 WBC
PLATELET # BLD AUTO: 194 K/UL (ref 150–450)
PMV BLD AUTO: 12.1 FL (ref 9.2–12.9)
POTASSIUM SERPL-SCNC: 4.6 MMOL/L (ref 3.5–5.1)
PROT SERPL-MCNC: 7.5 G/DL (ref 6–8.4)
RBC # BLD AUTO: 4.36 M/UL (ref 4.6–6.2)
SODIUM SERPL-SCNC: 142 MMOL/L (ref 136–145)
TRIGL SERPL-MCNC: 91 MG/DL (ref 30–150)
WBC # BLD AUTO: 5.96 K/UL (ref 3.9–12.7)

## 2024-12-11 PROCEDURE — 36415 COLL VENOUS BLD VENIPUNCTURE: CPT | Performed by: PHYSICIAN ASSISTANT

## 2024-12-11 PROCEDURE — 85025 COMPLETE CBC W/AUTO DIFF WBC: CPT | Performed by: PHYSICIAN ASSISTANT

## 2024-12-11 PROCEDURE — 83036 HEMOGLOBIN GLYCOSYLATED A1C: CPT | Performed by: PHYSICIAN ASSISTANT

## 2024-12-11 PROCEDURE — 80053 COMPREHEN METABOLIC PANEL: CPT | Performed by: PHYSICIAN ASSISTANT

## 2024-12-11 PROCEDURE — 80061 LIPID PANEL: CPT | Performed by: PHYSICIAN ASSISTANT

## 2024-12-12 ENCOUNTER — PATIENT MESSAGE (OUTPATIENT)
Dept: INTERNAL MEDICINE | Facility: CLINIC | Age: 54
End: 2024-12-12
Payer: COMMERCIAL

## 2024-12-18 ENCOUNTER — OFFICE VISIT (OUTPATIENT)
Dept: INTERNAL MEDICINE | Facility: CLINIC | Age: 54
End: 2024-12-18
Payer: COMMERCIAL

## 2024-12-18 VITALS
RESPIRATION RATE: 20 BRPM | BODY MASS INDEX: 47.7 KG/M2 | HEART RATE: 76 BPM | DIASTOLIC BLOOD PRESSURE: 88 MMHG | WEIGHT: 315 LBS | OXYGEN SATURATION: 95 % | SYSTOLIC BLOOD PRESSURE: 138 MMHG | TEMPERATURE: 98 F

## 2024-12-18 DIAGNOSIS — M79.672 PAIN IN BOTH FEET: ICD-10-CM

## 2024-12-18 DIAGNOSIS — D64.9 MILD ANEMIA: ICD-10-CM

## 2024-12-18 DIAGNOSIS — M79.671 PAIN IN BOTH FEET: ICD-10-CM

## 2024-12-18 DIAGNOSIS — F32.A MILD DEPRESSION: ICD-10-CM

## 2024-12-18 DIAGNOSIS — Z00.00 ANNUAL PHYSICAL EXAM: Primary | ICD-10-CM

## 2024-12-18 DIAGNOSIS — E66.01 MORBID OBESITY WITH BMI OF 45.0-49.9, ADULT: ICD-10-CM

## 2024-12-18 DIAGNOSIS — I10 ESSENTIAL HYPERTENSION: ICD-10-CM

## 2024-12-18 PROCEDURE — 1160F RVW MEDS BY RX/DR IN RCRD: CPT | Mod: CPTII,S$GLB,, | Performed by: INTERNAL MEDICINE

## 2024-12-18 PROCEDURE — 3044F HG A1C LEVEL LT 7.0%: CPT | Mod: CPTII,S$GLB,, | Performed by: INTERNAL MEDICINE

## 2024-12-18 PROCEDURE — 3075F SYST BP GE 130 - 139MM HG: CPT | Mod: CPTII,S$GLB,, | Performed by: INTERNAL MEDICINE

## 2024-12-18 PROCEDURE — 99396 PREV VISIT EST AGE 40-64: CPT | Mod: S$GLB,,, | Performed by: INTERNAL MEDICINE

## 2024-12-18 PROCEDURE — 99999 PR PBB SHADOW E&M-EST. PATIENT-LVL III: CPT | Mod: PBBFAC,,, | Performed by: INTERNAL MEDICINE

## 2024-12-18 PROCEDURE — 3008F BODY MASS INDEX DOCD: CPT | Mod: CPTII,S$GLB,, | Performed by: INTERNAL MEDICINE

## 2024-12-18 PROCEDURE — 3079F DIAST BP 80-89 MM HG: CPT | Mod: CPTII,S$GLB,, | Performed by: INTERNAL MEDICINE

## 2024-12-18 PROCEDURE — 1159F MED LIST DOCD IN RCRD: CPT | Mod: CPTII,S$GLB,, | Performed by: INTERNAL MEDICINE

## 2024-12-18 NOTE — PROGRESS NOTES
Odilonjovan Del Rio  54 y.o. Black or  male  [id number]    Chief Complaint:  Chief Complaint   Patient presents with    Annual Exam       History of Present Illness:  History of Present Illness    CHIEF COMPLAINT:  Mr. Del Rio presents today for annual check-up.    MEDICATIONS:  He continues amlodipine and atenolol for blood pressure management.    LABS:  Complete blood count showed mild anemia. Albumin was on the low side of normal, and total bilirubin was on the high side of normal. Cholesterol panel, A1C, and comprehensive metabolic panel were normal.       DEPRESSION:  He reports mild to moderate depression that varies daily, with some days being symptom-free. There may be a seasonal component to his symptoms. He is currently seeing a therapist but is uncertain about its effectiveness. He declines antidepressant medication due to [facility] licensing requirements.    MUSCULOSKELETAL:  He reports intermittent pain in right heel and top of left foot that can become severe enough to alter gait when present. He denies swelling, redness, numbness, or tingling in feet. He has not taken any medications for pain management.    Review of Systems   Constitutional:  Positive for malaise/fatigue. Negative for weight loss.   Respiratory:  Negative for shortness of breath.    Cardiovascular:  Negative for chest pain and leg swelling.   Gastrointestinal:  Negative for abdominal pain.   Genitourinary:  Negative for dysuria.   Musculoskeletal:  Positive for joint pain.   Neurological:  Negative for dizziness and headaches.   Psychiatric/Behavioral:  Positive for depression. The patient is not nervous/anxious and does not have insomnia.        Laboratory  Lab Results   Component Value Date    WBC 5.96 12/11/2024    HGB 13.4 (L) 12/11/2024    HCT 41.8 12/11/2024     12/11/2024    CHOL 189 12/11/2024    TRIG 91 12/11/2024    HDL 44 12/11/2024    ALT 25 12/11/2024    AST 28 12/11/2024     12/11/2024    K 4.6  12/11/2024     12/11/2024    CREATININE 1.1 12/11/2024    BUN 14 12/11/2024    CO2 25 12/11/2024    TSH 1.404 07/28/2023    PSA 0.49 06/21/2024    HGBA1C 4.6 12/11/2024     Lab Results   Component Value Date    LDLCALC 126.8 12/11/2024       The following were reviewed: Active problem list, medication list, allergies, family history, social history, and Health Maintenance.     History:  Past Medical History:   Diagnosis Date    Hypertension     Morbid obesity     CHARLINE (obstructive sleep apnea)     Testosterone deficiency      Past Surgical History:   Procedure Laterality Date    COLONOSCOPY N/A 10/16/2023    Procedure: COLONOSCOPY;  Surgeon: Israel Simmons MD;  Location: [facility];  Service: Endoscopy;  Laterality: N/A;     Family History   Problem Relation Name Age of Onset    Hypertension Mother Mom     Diabetes Mother Mom     Hypertension Father Dad      Social History     Socioeconomic History    Marital status:    Occupational History     Employer: [employer]   Tobacco Use    Smoking status: Never    Smokeless tobacco: Never   Substance and Sexual Activity    Alcohol use: Yes     Comment: occ    Drug use: Never    Sexual activity: Yes     Partners: Female     Birth control/protection: Coitus interruptus, Condom   Social History Narrative    ** Merged History Encounter **          Social Drivers of Health     Financial Resource Strain: Low Risk  (7/8/2024)    Overall Financial Resource Strain (CARDIA)     Difficulty of Paying Living Expenses: Not very hard   Food Insecurity: No Food Insecurity (7/8/2024)    Hunger Vital Sign     Worried About Running Out of Food in the Last Year: Never true     Ran Out of Food in the Last Year: Never true   Transportation Needs: No Transportation Needs (10/6/2023)    PRAPARE - Transportation     Lack of Transportation (Medical): No     Lack of Transportation (Non-Medical): No   Physical Activity: Inactive (7/8/2024)    Exercise Vital Sign     Days of Exercise  per Week: 0 days     Minutes of Exercise per Session: 10 min   Stress: Stress Concern Present (7/8/2024)    [employer] [place] [employer] - Occupational Stress Questionnaire     Feeling of Stress : To some extent   Housing Stability: Low Risk  (10/6/2023)    Housing Stability Vital Sign     Unable to Pay for Housing in the Last Year: No     Number of Places Lived in the Last Year: 1     Unstable Housing in the Last Year: No     Patient Active Problem List   Diagnosis    Essential hypertension    BMI 40.0-44.9, adult    Hypogonadism male    CHARLINE on CPAP    Erectile dysfunction    Prostate cancer screening    Colon cancer screening     Review of patient's allergies indicates:   Allergen Reactions    No known drug allergies        Health Maintenance  Health Maintenance Topics with due status: Not Due       Topic Last Completion Date    Colorectal Cancer Screening 10/16/2023    Hemoglobin A1c (Diabetic Prevention Screening) 12/11/2024    Lipid Panel 12/11/2024    RSV Vaccine (Age 60+ and Pregnant patients) Not Due     Health Maintenance Due   Topic Date Due    Shingles Vaccine (1 of 2) Never done       Medications:  Current Outpatient Medications on File Prior to Visit   Medication Sig Dispense Refill    amLODIPine (NORVASC) 10 MG tablet Take 1 tablet (10 mg total) by mouth once daily. 90 tablet 3    atenoloL (TENORMIN) 50 MG tablet Take 1 tablet (50 mg total) by mouth once daily. 90 tablet 3    multivitamin (THERAGRAN) per tablet Take 1 tablet by mouth once daily.      tadalafiL (CIALIS) 20 MG Tab Take 1 tablet (20 mg total) by mouth every 24 hours as needed (erectile dysfunction). 20 tablet 11     No current facility-administered medications on file prior to visit.       Medications have been reviewed and reconciled with patient at visit today.    Exam:  Vitals:    12/18/24 1512   BP: 138/88   Pulse: 76   Resp: 20   Temp: 98.3 °F (36.8 °C)     Weight: (!) 164 kg (361 lb 8.9 oz)   Body mass index is 47.7  kg/m².      Physical Exam    Vitals: Reviewed.  Constitutional: No acute distress. Well-developed. Not ill-appearing.  Eyes: No scleral icterus.  Cardiovascular: Normal rate and regular rhythm. Normal heart sounds.  Pulmonary: Pulmonary effort is normal. No respiratory distress. Normal breath sounds.  Abdomen: Soft. Nontender. Nondistended. Normoactive bowel sounds.  Extremities: No edema.  Skin: Warm. Dry.  Neurological: Alert and oriented to person, place, and time.  Psychiatric: Behavior normal.         Assessment:  The primary encounter diagnosis was Annual physical exam. Diagnoses of Essential hypertension, Mild anemia, Mild depression, Pain in both feet, and Morbid obesity with BMI of 45.0-49.9, adult were also pertinent to this visit.      Assessment & Plan    ANNUAL PHYSICAL EXAM:  - Counseled regarding age appropriate screenings and immunizations  - Counseled regarding lifestyle modifications    HYPERTENSION:  - Assessed blood pressure as higher end of acceptable range; identified need for further reduction.  - Provided information on new blood pressure guidelines (less than 130/80) from cardiology associations and American Heart Association.  - Mr. Del Rio to reduce salt intake.  - Recommend increasing consumption of fruits and vegetables.  - Mr. Del Rio to modify cooking methods: less frying, more baking and grilling.  - Recommend increasing physical activity.  - Continued amlodipine (refilled for 1 year in July) and tenolol.    ANEMIA:  - Reviewed recent lab results: cholesterol panel normal, A1C normal (no diabetes/prediabetes), comprehensive metabolic panel normal, mild stable anemia.  - Discussed the effectiveness of OTC iron supplements for mild anemia.  - Mr. Del Rio to increase iron intake through diet or OTC supplements.    DEPRESSION:  - Evaluated patient's reported lack of motivation and depressive symptoms; patient currently seeing therapist.  - Considered but did not recommend antidepressant  medication due to potential CDL restrictions and patient preference.    FOOT PAIN:  - Assessed patient's foot pain in right heel and top of left foot; symptoms appear random and not consistent with plantar fasciitis.  - Recommend OTC Tylenol or Aleve as needed for foot pain.    FOLLOW-UP:  - Follow up in 1 year.  - Contact the office if depression worsens or foot pain becomes more problematic.

## 2025-06-26 ENCOUNTER — TELEPHONE (OUTPATIENT)
Dept: INTERNAL MEDICINE | Facility: CLINIC | Age: 55
End: 2025-06-26
Payer: COMMERCIAL

## 2025-06-26 NOTE — TELEPHONE ENCOUNTER
Copied from CRM #[id number]. Topic: Appointments - Appointment Rescheduling  >> Jun 26, 2025  1:31 PM Tara wrote:  .Patient is calling to speak with the nurse regarding orders  . Reports needing lab work orders sent back in  . Please give patient a call back at .[phone]

## 2025-07-01 ENCOUNTER — LAB VISIT (OUTPATIENT)
Dept: LAB | Facility: HOSPITAL | Age: 55
End: 2025-07-01
Attending: UROLOGY
Payer: COMMERCIAL

## 2025-07-01 DIAGNOSIS — Z12.5 PROSTATE CANCER SCREENING: ICD-10-CM

## 2025-07-01 LAB — PSA SERPL-MCNC: 0.58 NG/ML

## 2025-07-01 PROCEDURE — 36415 COLL VENOUS BLD VENIPUNCTURE: CPT

## 2025-07-01 PROCEDURE — 84153 ASSAY OF PSA TOTAL: CPT

## 2025-07-18 ENCOUNTER — OFFICE VISIT (OUTPATIENT)
Dept: UROLOGY | Facility: CLINIC | Age: 55
End: 2025-07-18
Payer: COMMERCIAL

## 2025-07-18 VITALS
DIASTOLIC BLOOD PRESSURE: 96 MMHG | SYSTOLIC BLOOD PRESSURE: 160 MMHG | HEIGHT: 73 IN | BODY MASS INDEX: 41.75 KG/M2 | HEART RATE: 66 BPM | WEIGHT: 315 LBS

## 2025-07-18 DIAGNOSIS — Z12.5 PROSTATE CANCER SCREENING: ICD-10-CM

## 2025-07-18 DIAGNOSIS — N52.9 ERECTILE DYSFUNCTION, UNSPECIFIED ERECTILE DYSFUNCTION TYPE: Primary | ICD-10-CM

## 2025-07-18 PROCEDURE — 99999 PR PBB SHADOW E&M-EST. PATIENT-LVL III: CPT | Mod: PBBFAC,,, | Performed by: UROLOGY

## 2025-07-18 RX ORDER — TADALAFIL 20 MG/1
20 TABLET ORAL
Qty: 20 TABLET | Refills: 11 | Status: SHIPPED | OUTPATIENT
Start: 2025-07-18 | End: 2025-07-18

## 2025-07-18 RX ORDER — TADALAFIL 20 MG/1
20 TABLET ORAL
Qty: 20 TABLET | Refills: 11 | Status: SHIPPED | OUTPATIENT
Start: 2025-07-18

## 2025-07-18 NOTE — PROGRESS NOTES
Chief Complaint:   Encounter Diagnoses   Name Primary?    Erectile dysfunction, unspecified erectile dysfunction type Yes    Prostate cancer screening          HPI:   7/18/25- Cialis is working well, no voiding complaints.    5/28/20: 48 yo man referred for low T workup.  Uses CPAP.  No abd/pelvic pain and no exac/rel factors.  No hematuria.  No urolithiasis.  No urinary bother.  No  history.  Normal sexual function.  Dropped 70 lbs a few years ago and put it back on.  No family CaP.  T was 400 with a normal free T value calculated.  Feels low desire to do anything, ED is the main .    Allergies:  No known drug allergies    Medications:  has a current medication list which includes the following prescription(s): amlodipine, atenolol, multivitamin, and tadalafil.    Review of Systems:  General: No fever, chills, fatigability, or weight loss.  Skin: No rashes, itching, or changes in color or texture of skin.  Chest: Denies LONG, cyanosis, wheezing, cough, and sputum production.  Abdomen: Appetite fine. No weight loss. Denies diarrhea, abdominal pain, hematemesis, or0 blood in stool.  Musculoskeletal: No joint stiffness or swelling. Denies back pain.  : As above.  All other review of systems negative.    PMH:   has a past medical history of Hypertension, Morbid obesity, CHARLINE (obstructive sleep apnea), and Testosterone deficiency.    PSH:   has a past surgical history that includes Colonoscopy (N/A, 10/16/2023).    FamHx: family history includes Diabetes in his mother; Hypertension in his father and mother.    SocHx:  reports that he has never smoked. He has never used smokeless tobacco. He reports current alcohol use. He reports that he does not use drugs.      Physical Exam:  Vitals:    07/18/25 1510   BP: (!) 160/96   Pulse: 66     General: A&Ox3, no apparent distress, no deformities  Neck: No masses, normal thyroid  Lungs: normal inspiration, no use of accessory muscles  Heart: normal pulse, no  arrhythmias  Abdomen: Soft, NT, ND, no masses, no hernias, no hepatosplenomegaly  Lymphatic: Neck and groin nodes negative  Skin: The skin is warm and dry. No jaundice.  Ext: No c/c/e.    Labs/Studies:   PSA 0.58 7/25    Impression/Plan:       1. Erectile dysfunction- Cialis 20 mg is working well, no voiding complaints.  Call with any issues prior to the next appointment in 1 year with a PSA.    2. PSA screening- PSA stable continue yearly.